# Patient Record
Sex: FEMALE | Race: WHITE | NOT HISPANIC OR LATINO | Employment: FULL TIME | URBAN - METROPOLITAN AREA
[De-identification: names, ages, dates, MRNs, and addresses within clinical notes are randomized per-mention and may not be internally consistent; named-entity substitution may affect disease eponyms.]

---

## 2023-06-28 PROBLEM — R42 VERTIGO: Status: ACTIVE | Noted: 2021-09-08

## 2023-06-28 PROBLEM — J30.9 ALLERGIC RHINITIS: Status: ACTIVE | Noted: 2022-09-15

## 2023-06-28 PROBLEM — E03.9 HYPOTHYROIDISM: Status: ACTIVE | Noted: 2022-09-15

## 2023-06-28 PROBLEM — E78.2 MIXED HYPERLIPIDEMIA: Chronic | Status: ACTIVE | Noted: 2022-09-15

## 2023-06-28 PROBLEM — K21.9 ESOPHAGEAL REFLUX: Chronic | Status: ACTIVE | Noted: 2022-09-15

## 2023-06-28 PROBLEM — I10 ESSENTIAL HYPERTENSION: Chronic | Status: ACTIVE | Noted: 2022-09-15

## 2023-06-28 PROBLEM — L98.0 PYOGENIC GRANULOMA: Status: ACTIVE | Noted: 2019-07-31

## 2023-06-28 PROBLEM — F34.1 CHRONIC DEPRESSIVE PERSON: Chronic | Status: ACTIVE | Noted: 2022-09-15

## 2024-03-01 ENCOUNTER — OFFICE VISIT (OUTPATIENT)
Dept: INTERNAL MEDICINE | Facility: CLINIC | Age: 74
End: 2024-03-01
Payer: MEDICARE

## 2024-03-01 VITALS
DIASTOLIC BLOOD PRESSURE: 64 MMHG | WEIGHT: 187 LBS | RESPIRATION RATE: 18 BRPM | BODY MASS INDEX: 28.34 KG/M2 | HEART RATE: 84 BPM | OXYGEN SATURATION: 98 % | HEIGHT: 68 IN | SYSTOLIC BLOOD PRESSURE: 110 MMHG

## 2024-03-01 DIAGNOSIS — G89.29 CHRONIC LEFT SHOULDER PAIN: ICD-10-CM

## 2024-03-01 DIAGNOSIS — M25.512 CHRONIC LEFT SHOULDER PAIN: ICD-10-CM

## 2024-03-01 DIAGNOSIS — Z12.31 SCREENING MAMMOGRAM, ENCOUNTER FOR: ICD-10-CM

## 2024-03-01 DIAGNOSIS — M25.561 ACUTE PAIN OF RIGHT KNEE: Primary | ICD-10-CM

## 2024-03-01 NOTE — PROGRESS NOTES
Subjective   Amanda Hernandez is a 73 y.o. female.   Chief Complaint   Patient presents with    Shoulder Pain    Knee Pain   Answers submitted by the patient for this visit:  Primary Reason for Visit (Submitted on 2/23/2024)  What is the primary reason for your visit?: Extremity Pain  Lower Extremity Injury Questionnaire (Submitted on 2/23/2024)  Chief Complaint: Extremity pain  Injury: Yes  Injury mechanism: a fall  Foreign body present: no foreign bodies      Vitals:    03/01/24 1256   BP: 110/64   Pulse: 84   Resp: 18   SpO2: 98%     No LMP recorded. Patient is postmenopausal.    History of Present Illness  Mrs Hernandez is a 73 year old female patient who is here for an acute visit. She c/o right knee pain that started after she fell at home a few months ago . She states it was around the holidays. She fell at home. She was trying to get her dog into the kennel and stumbled over it. She states she went down on one of her sides but doesn't remember a direct blow to her knee. She states the pain is worse when she is walking her dog. She often feels that her knee is unstable . She is able to bear weight   She also c/o left shoulder pain for more than 6 months. She denies any injury. She is unable to lift her arm above her head       The following portions of the patient's history were reviewed and updated as appropriate: allergies, current medications, past family history, past medical history, past social history, past surgical history, and problem list.    Review of Systems   Musculoskeletal:  Negative for gait problem.        Right knee pain and left shoulder pain as described in HPI   Neurological:  Negative for weakness and numbness.       Objective   Physical Exam  Vitals and nursing note reviewed.   Constitutional:       General: She is not in acute distress.  Cardiovascular:      Rate and Rhythm: Normal rate.   Pulmonary:      Effort: Pulmonary effort is normal.   Musculoskeletal:      Left shoulder: No swelling,  deformity, tenderness or crepitus. Decreased range of motion. Normal pulse.      Left knee: No swelling, deformity or bony tenderness. Normal range of motion. Tenderness present. No patellar tendon tenderness.   Neurological:      Mental Status: She is alert.         Assessment & Plan   Diagnoses and all orders for this visit:    1. Acute pain of right knee (Primary)  -     Ambulatory Referral to Physical Therapy Evaluate and treat    2. Chronic left shoulder pain  -     Ambulatory Referral to Orthopedic Surgery    3. Screening mammogram, encounter for  -     Mammo screening digital tomosynthesis bilateral w CAD; Future    Will refer to PT    Recommended tylenol as needed  Apply heat or ice as needed  Activity as tolerated  Offered medrol dosepak but she declined. She may benefit from joint injections , will refer to ortho for consult  She is due for screening mammogram   Follow up as needed and for continual care

## 2024-03-04 ENCOUNTER — TELEPHONE (OUTPATIENT)
Dept: INTERNAL MEDICINE | Facility: CLINIC | Age: 74
End: 2024-03-04
Payer: MEDICARE

## 2024-03-04 DIAGNOSIS — M25.561 ACUTE PAIN OF RIGHT KNEE: Primary | ICD-10-CM

## 2024-03-04 NOTE — TELEPHONE ENCOUNTER
Please call the patient, ortho referral changed for knee evaluation only   I am still concerned about her shoulder due to limited range of motion

## 2024-03-04 NOTE — TELEPHONE ENCOUNTER
"Pt calling stating she recently saw Susanne and they both discussed the pain in pts right knee and shoulder pain- Pt states she called to schedule appt to see the orthopedic surgeon and they state that the referral that was sent over was only in regards to pts shoulder pain and they will only see pt for \"one body part only\"- pt states she can live with the shoulder pain and she is more focused on figuring out what's going on with her knee- Pt would like the referral to be in regards to her right knee pain and not her shoulder pain if possible- Pt states she does not want to do physical therapy for her knee until she finds out what is wrong with it- Please advise  "

## 2024-03-04 NOTE — TELEPHONE ENCOUNTER
Spoke with patient, she definitely still wants to see Ortho for her Shoulder but she needed another referral for her knee as well.   They wouldn't schedule 1 appointment for both issues.

## 2024-03-11 NOTE — PROGRESS NOTES
[facility]  New Problem      Patient Name: Amanda Hernandez  : 1950  Primary Care Physician: Susanne Hernandez APRN        Chief Complaint: Right knee pain, left shoulder pain    HPI:   Amanda Hernandez is a 73 y.o. year old who presents today for evaluation of right knee pain, left shoulder pain.    Patient reports her knee bothers her the most right now.  She did have a fall back around gi and more recently has had worsening pain and symptoms.  She has mostly medial sided knee pain.  She had a history of torn meniscus many years ago and did have arthroscopy although she cannot recall exactly which knee it was.  She says this pain really feels a lot like that.  She enjoys walking her dog but has found that she is really only able to walk half a mile and she is pretty miserable by the time she returns back from her walk and is limping quite a bit.      Her shoulder is a more chronic issue, does not bother her quite as much as the knee but she is noticed some increasing pain recently.  She has some trouble with range of motion especially lateral raising.  She really is just modified her activities to account for her symptoms.     She usually manages her symptoms with Tylenol and rest.  She was referred for physical therapy on her knee and has an appointment on 3/27.    She also reported some hip pain she has a separate visit to address that but she reports that the hip pain is mostly posterior lateral in both hips.  No numbness or tingling no bowel or bladder changes.  She denies any dedicated groin pain.      Past Medical/Surgical, Social and Family History:  I have reviewed and/or updated pertinent history as noted in the medical record including:  Past Medical History:   Diagnosis Date    Allergic 1960    Been most of my life    Arthritis     GERD (gastroesophageal reflux disease)     Hyperlipidemia  ?    Hypertension  ?    Hyperthyroidism     Visual impairment Wear glasses      Past Surgical History:   Procedure Laterality Date    APPENDECTOMY      COLONOSCOPY   ?    HYSTERECTOMY  1992    TONSILLECTOMY   ?     Social History     Occupational History    Not on file   Tobacco Use    Smoking status: Former     Current packs/day: 0.00     Average packs/day: 0.5 packs/day for 2.0 years (1.0 ttl pk-yrs)     Types: Cigarettes     Start date: 1971     Quit date: 1973     Years since quittin.2    Smokeless tobacco: Never   Vaping Use    Vaping status: Never Used   Substance and Sexual Activity    Alcohol use: Not Currently    Drug use: Never    Sexual activity: Not Currently          Allergies:   Allergies   Allergen Reactions    Morphine And Related GI Intolerance, Nausea And Vomiting and Nausea Only    Sulfa Antibiotics Nausea And Vomiting       Medications:   Home Medications:  Current Outpatient Medications on File Prior to Visit   Medication Sig    acetaminophen (TYLENOL) 650 MG 8 hr tablet Take 2 tablets by mouth 4 (Four) Times a Day.    aspirin 81 MG EC tablet Take 1 tablet by mouth Daily.    atorvastatin (LIPITOR) 40 MG tablet Take 1 tablet by mouth Daily.    Calcium Carbonate-Vitamin D (CALTRATE 600+D PO) Take  by mouth.    escitalopram (LEXAPRO) 10 MG tablet Take 1 tablet by mouth Daily.    esomeprazole (nexIUM) 20 MG capsule Take 1 capsule by mouth Every Morning Before Breakfast.    fluticasone (FLONASE) 50 MCG/ACT nasal spray INSTILL 1 SPRAY INTO EACH NOSTRIL DAILY    guaiFENesin (MUCINEX) 600 MG 12 hr tablet Take 1 tablet by mouth.    hydroCHLOROthiazide (HYDRODIURIL) 25 MG tablet Take 1 tablet by mouth Every Morning.    levothyroxine (SYNTHROID, LEVOTHROID) 88 MCG tablet Take 1 tablet by mouth Daily.    losartan (COZAAR) 100 MG tablet Take 1 tablet by mouth Daily.    Omega-3 Fatty Acids (OMEGA 3 PO) Take  by mouth.    vitamin D3 125 MCG (5000 UT) capsule capsule Take 1 capsule by mouth Daily.     No current facility-administered medications on file prior  to visit.         ROS:  14 point review of systems was negative except as listed in the HPI.    Physical Exam:   73 y.o. female  Body mass index is 30.45 kg/m²., 83 kg (183 lb)  Vitals:    03/12/24 1429   Temp: 98.4 °F (36.9 °C)     General: Alert, cooperative, appears well and in no observable distress. Appears stated age and BMI as listed above.  HEENT: Normocephalic, atraumatic on external visual inspection.  CV: No significant peripheral edema.  Respiratory: Normal respiratory effort.  Skin: Warm & well perfused; appropriate skin turgor.  Psych: Appropriate mood & affect.  Neuro: Gross sensation and motor intact in affected extremity/extremities.  Vascular: Peripheral pulses palpable in affected extremity/extremities.     MSK Exam:    Knee Exam:    Right   No deformity or wounds appreciated. No significant redness or warmth.  Trace effusion noted.  Tenderness along the joint line appreciated medially.  ROM 0-130 with pain at terminal motion.  Ligamentous exam grossly stable.  Paulina + medial  Bounce Home +  Quad strength 4-4+/5    Left   No deformity or wounds appreciated. No significant redness or warmth.  No significant effusion noted.  No significant tenderness appreciated about the joint.  ROM 0-130 and painless.  Ligamentous exam grossly stable.  Quad strength 4+ to 5/5.    Brief hip exam in the affected extremity(ies) grossly unremarkable.  Moves ankle and toes up and down, no significant pain or swelling in the foot, ankle or calf.     Left Shoulder  No obvious deformities or wounds.   No redness or significant swelling.  Tenderness  lateral acromial, posterior acromial  Definitive painful arc.  +empty can  Negative drop arm test  AROM  Flexion 140 passively I can get her to 170 with pain  ER 50   IR lower thoracic spine     Rotator Cuff Strength:  Supraspinatus: 3+  ER: 3+   IR: 4+  Isometric testing of the rotator cuff is painful.      Brief examination of the cervical spine did not reproduce any  specific radicular pattern or symptoms.   Strength is reasonable and symmetric in the upper extremities.    Radiology:    The following X-rays were ordered/reviewed today to evaluate the patient's symptoms: Single Knee: AP standing and sunrise views of both knees, and lateral view of painful knee show overall she has reasonable maintenance of joint space in both knees.  Maybe some slight subtle narrowing but no significant degenerative changes appreciated.  No other acute or chronic bony pathology to account for reported symptoms. Shoulder: AP, Scapular Y and Axillary Lateral of left shoulder(s) show Degenerative changes at the acromioclavicular joint, she does have some cystic changes as well in the humeral head at the greater tuberosity.  I do appreciate some subtle glenohumeral narrowing as well.  Aside from that I do not appreciate any acute or chronic findings to account for her pain.. There are no prior films available for direct comparison.    Procedure:   See Procedure Note: The potential risks and benefits of performing a diagnostic and therapeutic injection were discussed with the patient prior to procedure. Risks include, but are not limited to infection, swelling, transient increase in pain, bleeding, bruising. Patient was advised that injections are a diagnostic and therapeutic tool meaning they may not alleviate symptoms at all, or may only provide partial or temporary relief. Injection precautions and aftercare discussed.    Large Joint Arthrocentesis: L subacromial bursa  Date/Time: 3/12/2024 3:03 PM  Consent given by: patient  Site marked: site marked  Timeout: Immediately prior to procedure a time out was called to verify the correct patient, procedure, equipment, support staff and site/side marked as required   Supporting Documentation  Indications: pain   Procedure Details  Location: shoulder - L subacromial bursa  Preparation: Patient was prepped and draped in the usual sterile fashion  Needle  gauge: 21G.  Approach: posterior  Medications administered: 80 mg methylPREDNISolone acetate 80 MG/ML; 2 mL lidocaine PF 1% 1 %  Patient tolerance: patient tolerated the procedure well with no immediate complications      Large Joint Arthrocentesis: R knee  Date/Time: 3/12/2024 3:03 PM  Consent given by: patient  Site marked: site marked  Timeout: Immediately prior to procedure a time out was called to verify the correct patient, procedure, equipment, support staff and site/side marked as required   Supporting Documentation  Indications: pain, joint swelling and diagnostic evaluation   Procedure Details  Location: knee - R knee  Preparation: Patient was prepped and draped in the usual sterile fashion  Needle gauge: 21G.  Medications administered: 1 mL methylPREDNISolone acetate 80 MG/ML; 2 mL lidocaine PF 1% 1 %  Patient tolerance: patient tolerated the procedure well with no immediate complications          Misc. Data/Labs: N/A    Assessment & Plan:    ICD-10-CM ICD-9-CM   1. Chronic left shoulder pain  M25.512 719.41    G89.29 338.29   2. Dysfunction of left rotator cuff  M67.912 726.10   3. Chronic pain of right knee  M25.561 719.46    G89.29 338.29   4. Acute medial meniscus tear of right knee, subsequent encounter  S83.241D V58.89     836.0     No orders of the defined types were placed in this encounter.    Orders Placed This Encounter   Procedures    Large Joint Arthrocentesis: L subacromial bursa    Large Joint Arthrocentesis: R knee    XR Knee 3 View Right    XR Shoulder 2+ View Left     Is a 73-year-old female with right knee and left shoulder pain.  I do agree with her I think her right knee pain is concerning for meniscal pathology but I would try and treat this conservatively at least initially.  Will go ahead and try a cortisone injection as a diagnostic tool and I agree with getting her into some physical therapy.    As far as her shoulder goes I suspect this is rotator cuff pathology and I would lean  more towards this being a rotator cuff tear.  Plan is to proceed with a subacromial injection to help calm her pain symptoms down and I like her to also talk with therapy about exercises to strengthen her shoulder.    In our brief discussion about her hips I really think this is probably going to be more low back related.  I think physical therapy will benefit her and working on strengthening her knee and hips/core may alleviate her symptoms.  I will have her go ahead and make an appointment for about 4 to 5 weeks from now if she still having the hip pain will certainly work that up further.  Similarly if her symptoms change or worsen she can call me sooner.  If her shoulder and/or knee fails to improve with conservative treatment we will consider ordering an MRI to better evaluate her.    Return in about 5 weeks (around 4/16/2024).    Patient encouraged to call with questions or concerns prior to follow up.  Recommend ICE and/or HEAT PRN as discussed.  Will discuss with attending physician as needed.  Consider additional referrals, work up and/or advanced imaging as indicated or if patient fails to respond to conservative care.        Camilo Solano, APRN

## 2024-03-12 ENCOUNTER — OFFICE VISIT (OUTPATIENT)
Dept: ORTHOPEDIC SURGERY | Facility: CLINIC | Age: 74
End: 2024-03-12
Payer: MEDICARE

## 2024-03-12 VITALS — TEMPERATURE: 98.4 F | WEIGHT: 183 LBS | BODY MASS INDEX: 30.49 KG/M2 | HEIGHT: 65 IN

## 2024-03-12 DIAGNOSIS — S83.241D ACUTE MEDIAL MENISCUS TEAR OF RIGHT KNEE, SUBSEQUENT ENCOUNTER: ICD-10-CM

## 2024-03-12 DIAGNOSIS — M25.561 CHRONIC PAIN OF RIGHT KNEE: ICD-10-CM

## 2024-03-12 DIAGNOSIS — G89.29 CHRONIC PAIN OF RIGHT KNEE: ICD-10-CM

## 2024-03-12 DIAGNOSIS — M67.912 DYSFUNCTION OF LEFT ROTATOR CUFF: ICD-10-CM

## 2024-03-12 DIAGNOSIS — G89.29 CHRONIC LEFT SHOULDER PAIN: Primary | ICD-10-CM

## 2024-03-12 DIAGNOSIS — M25.512 CHRONIC LEFT SHOULDER PAIN: Primary | ICD-10-CM

## 2024-03-12 PROCEDURE — 20610 DRAIN/INJ JOINT/BURSA W/O US: CPT | Performed by: NURSE PRACTITIONER

## 2024-03-12 PROCEDURE — 73562 X-RAY EXAM OF KNEE 3: CPT | Performed by: NURSE PRACTITIONER

## 2024-03-12 PROCEDURE — 1159F MED LIST DOCD IN RCRD: CPT | Performed by: NURSE PRACTITIONER

## 2024-03-12 PROCEDURE — 99214 OFFICE O/P EST MOD 30 MIN: CPT | Performed by: NURSE PRACTITIONER

## 2024-03-12 PROCEDURE — 1160F RVW MEDS BY RX/DR IN RCRD: CPT | Performed by: NURSE PRACTITIONER

## 2024-03-12 PROCEDURE — 73030 X-RAY EXAM OF SHOULDER: CPT | Performed by: NURSE PRACTITIONER

## 2024-03-12 RX ORDER — METHYLPREDNISOLONE ACETATE 80 MG/ML
1 INJECTION, SUSPENSION INTRA-ARTICULAR; INTRALESIONAL; INTRAMUSCULAR; SOFT TISSUE
Status: COMPLETED | OUTPATIENT
Start: 2024-03-12 | End: 2024-03-12

## 2024-03-12 RX ORDER — METHYLPREDNISOLONE ACETATE 80 MG/ML
80 INJECTION, SUSPENSION INTRA-ARTICULAR; INTRALESIONAL; INTRAMUSCULAR; SOFT TISSUE
Status: COMPLETED | OUTPATIENT
Start: 2024-03-12 | End: 2024-03-12

## 2024-03-12 RX ORDER — LIDOCAINE HYDROCHLORIDE 10 MG/ML
2 INJECTION, SOLUTION EPIDURAL; INFILTRATION; INTRACAUDAL; PERINEURAL
Status: COMPLETED | OUTPATIENT
Start: 2024-03-12 | End: 2024-03-12

## 2024-03-12 RX ADMIN — METHYLPREDNISOLONE ACETATE 80 MG: 80 INJECTION, SUSPENSION INTRA-ARTICULAR; INTRALESIONAL; INTRAMUSCULAR; SOFT TISSUE at 15:03

## 2024-03-12 RX ADMIN — LIDOCAINE HYDROCHLORIDE 2 ML: 10 INJECTION, SOLUTION EPIDURAL; INFILTRATION; INTRACAUDAL; PERINEURAL at 15:03

## 2024-03-12 RX ADMIN — METHYLPREDNISOLONE ACETATE 1 ML: 80 INJECTION, SUSPENSION INTRA-ARTICULAR; INTRALESIONAL; INTRAMUSCULAR; SOFT TISSUE at 15:03

## 2024-03-15 ENCOUNTER — PATIENT ROUNDING (BHMG ONLY) (OUTPATIENT)
Dept: ORTHOPEDIC SURGERY | Facility: CLINIC | Age: 74
End: 2024-03-15
Payer: MEDICARE

## 2024-03-15 NOTE — PROGRESS NOTES
A avolution Message has been sent to the patient for PATIENT ROUNDING with [facility]

## 2024-03-27 ENCOUNTER — TREATMENT (OUTPATIENT)
Age: 74
End: 2024-03-27
Payer: MEDICARE

## 2024-03-27 DIAGNOSIS — M25.561 ACUTE PAIN OF RIGHT KNEE: Primary | ICD-10-CM

## 2024-03-27 DIAGNOSIS — Z74.09 IMPAIRED FUNCTIONAL MOBILITY, BALANCE, GAIT, AND ENDURANCE: ICD-10-CM

## 2024-03-27 PROCEDURE — 97110 THERAPEUTIC EXERCISES: CPT | Performed by: PHYSICAL THERAPIST

## 2024-03-27 PROCEDURE — 97162 PT EVAL MOD COMPLEX 30 MIN: CPT | Performed by: PHYSICAL THERAPIST

## 2024-03-27 PROCEDURE — 97530 THERAPEUTIC ACTIVITIES: CPT | Performed by: PHYSICAL THERAPIST

## 2024-03-27 NOTE — PATIENT INSTRUCTIONS
Access Code: [email or web address]  URL: [email or web address]  Date: 03/27/2024  Prepared by: Kiana Garcia    Exercises  - Supine Hip Adduction Isometric with Ball  - 1 x daily - 7 x weekly - 1 sets - 10 reps - 5 sec. hold  - Supine Straight Leg Hip Adduction and Quad Set with Ball  - 1 x daily - 7 x weekly - 1 sets - 10 reps - 5 sec. hold  - Hooklying Clamshell with Resistance  - 1 x daily - 7 x weekly - 1 sets - 10 reps - 5 sec. hold  - Hooklying Isometric Clamshell  - 1 x daily - 7 x weekly - 1 sets - 10 reps - 5 sec. hold  - Long Sitting Straight Leg Raise with External Rotation  - 1 x daily - 7 x weekly - 1 sets - 10 reps

## 2024-03-27 NOTE — PROGRESS NOTES
Physical Therapy Initial Evaluation and Plan of Care  [facility] [place]   [address] [place] [address]  P: [phone]       F: [phone]       Patient: Amanda Hernandez   : 1950  Visit Diagnoses:     ICD-10-CM ICD-9-CM   1. Acute pain of right knee  M25.561 719.46   2. Impaired functional mobility, balance, gait, and endurance  Z74.09 V49.89     Referring practitioner: JEFFERSON Villar  Date of Initial Visit: 3/27/2024  Today's Date: 3/27/2024  Patient seen for 1 sessions           Subjective Questionnaire: WOMAC: 46/96      Subjective Evaluation    History of Present Illness  Date of onset: 3/1/2024  Mechanism of injury: Patient reports right knee pain started during the holidays due to a fall.  Tripped over dog and fell, landed on left side. Tried to work it out, pain persisted.  MD gave her injection in left shoulder and right knee.      Had been able to walk the dog again since injection.      PMH copied from EMR:    Allergies and Adverse Reactions    Allergic rhinitis      Cardiac and Vasculature    Essential hypertension    Mixed hyperlipidemia      ENT    Vertigo      Endocrine and Metabolic    Hypothyroidism      Gastrointestinal Abdominal    Esophageal reflux      Mental Health    Chronic depressive person      Skin    Pyogenic granuloma      Subjective comment: Patient reports right knee pain.  Patient Occupation: [employer]-works from home. Pain  At best pain ratin  At worst pain rating: 3  Location: Right knee-medial  Quality: sore/tender since injection; sharp prior to injection.  Alleviating factors: Tylenol arthritis or advil.  Aggravating factors: standing, ambulation, squatting and sleeping  Symptom course: improved since injection.    Social Support  Lives in: condominium (first floor)  Lives with: alone    Hand dominance: right    Treatments  Current treatment: injection treatment  Patient Goals  Patient goals for therapy: decreased  pain and increased strength             Objective          Active Range of Motion   Left Knee   Flexion: 145 degrees   Extension: 0 degrees     Right Knee   Flexion: 140 degrees   Extension: 0 degrees     Patellar Mobility   Left Knee Patellar tendons within functional limits include the medial, lateral, superior and inferior.     Right Knee Patellar tendons within functional limits include the medial, lateral, superior and inferior.     Strength/Myotome Testing     Left Hip   Planes of Motion   Flexion: 4  Extension: 4  Abduction: 4  External rotation: 4  Internal rotation: 4    Right Hip   Planes of Motion   Flexion: 4+  Extension: 4  Abduction: 4  External rotation: 5  Internal rotation: 5    Left Knee   Flexion: 5  Extension: 5    Right Knee   Flexion: 5  Extension: 4    Tests     Left Hip   Positive Ely's.   Negative UTE.   Modified Raman: Positive.     Right Hip   Positive Ely's, UTE and FADIR.   Modified Raman: Positive.     Additional Tests Details  SIJ mal-alignment    Left Knee Flexibility Comments:   SLR: 60 degrees    Right Knee Flexibility Comments:   SLR: 60 degrees    Ambulation     Observational Gait   Gait: antalgic and asymmetric     Functional Assessment   Squat   Pain and sitting toward right side.     Single Leg Stance   Left: 0 seconds  Right: 7 seconds    Comments  5XSTS: 16.25 seconds (bias to right LE, swerving with up and down)          Assessment & Plan       Assessment  Impairments: abnormal gait, abnormal or restricted ROM, activity intolerance, impaired physical strength, lacks appropriate home exercise program and pain with function   Functional limitations: sleeping, walking, uncomfortable because of pain and standing (Squatting  )  Assessment details: Amanda Hernandez is a pleasant 73 y.o. female that presents with decreased knee and hip strength, decreased flexibility, impaired gait, impaired balance and pain limited functional activity tolerance. Pt will benefit from skilled PT  services in order to address listed impairments, decrease pain and restore function.    Prognosis: good  Prognosis details: Patient demonstrates good rehab potential as evidenced by high motivation to participate with PT POC and to return to PLOF/ADLs/IADLs.    Goals  Plan Goals: Short Term Goals (3 wks):  1.  Patient will have increased right knee VMO activation to WNL.  2.  Patient will have symmetrical SIJ alignment.  3.  Patient will demonstrate normalized gait pattern.  4.  Patient will have LE muscle flexibility WNLs.  5.  Patient will report decreased pain level to allow for improved restorative sleep.      Long Term Goals (6-8 wks):  1.  Patient will be independent in performance of HEP.  2.  Patient will have right knee strength of 5/5.  3.  Patient will have increased hip strength to 4+/5.  4.  Patient will have improved LEFS score of 70/80 or better.  5.  Patient will be able to perform functional squat without pain compensations.      Plan  Therapy options: will be seen for skilled therapy services  Planned modality interventions: ultrasound, thermotherapy (hydrocollator packs), cryotherapy and dry needling  Planned therapy interventions: manual therapy, soft tissue mobilization, strengthening, stretching, balance/weight-bearing training, flexibility, functional ROM exercises, gait training, joint mobilization, home exercise program, neuromuscular re-education, therapeutic activities, abdominal trunk stabilization and body mechanics training  Frequency: 1x week  Duration in weeks: 8  Treatment plan discussed with: patient  Plan details: Pt was educated on the importance of their HEP and their current need for continued skilled physical therapy. Patients goals and potential limitations were discussed and pt is in agreement with current plan of care and treatment emphasis.                History # of Personal Factors and/or Comorbidities: HIGH (3+)  Examination of Body System(s): # of elements: MODERATE  (3)  Clinical Presentation: STABLE   Clinical Decision Making: MODERATE      Timed:         Manual Therapy:         mins  27140;     Therapeutic Exercise:    20     mins  39731;     Neuromuscular Maggi:        mins  13449;    Therapeutic Activity:     10     mins  67223;     Gait Training:           mins  15804;     Ultrasound:          mins  43362;    Ionto                                  mins  37077  Self Care                            mins  04369  Canalith Repos         mins  21756  Orthotic MGMT/Train         mins  43286    Un-Timed:  Electrical Stimulation:         mins  94277 ( );  Dry Needling:          mins  87853 self-pay;  Dry Needling:          mins  28047 self-pay  Traction          mins  63080  Low Eval          mins  95218  Mod Eval     30     mins  05283  High Eval                            mins  28802    Timed Treatment:   30   mins   Total Treatment:     60   mins      PT SIGNATURE: Kiana Garcia PT     License Number: [id number]  Electronically signed by Kiana Garcia PT, 03/27/24, 11:40 AM EDT      DATE TREATMENT INITIATED: 3/27/2024    Initial Certification  Certification Period: 3/27/2024 thru 6/24/2024  I certify that the therapy services are furnished while this patient is under my care.  The services outlined above are required by this patient, and will be reviewed every 90 days.     PHYSICIAN: Susanne Hernandez APRN      NPI: [id number]  DATE:         Please sign and return via fax to [phone]. Thank you, [facility].

## 2024-04-03 RX ORDER — FLUTICASONE PROPIONATE 50 MCG
2 SPRAY, SUSPENSION (ML) NASAL DAILY
Qty: 16 G | Refills: 0 | Status: SHIPPED | OUTPATIENT
Start: 2024-04-03 | End: 2024-04-04

## 2024-04-04 ENCOUNTER — TREATMENT (OUTPATIENT)
Age: 74
End: 2024-04-04
Payer: MEDICARE

## 2024-04-04 DIAGNOSIS — Z74.09 IMPAIRED FUNCTIONAL MOBILITY, BALANCE, GAIT, AND ENDURANCE: ICD-10-CM

## 2024-04-04 DIAGNOSIS — M25.561 ACUTE PAIN OF RIGHT KNEE: Primary | ICD-10-CM

## 2024-04-04 PROCEDURE — 97112 NEUROMUSCULAR REEDUCATION: CPT | Performed by: PHYSICAL THERAPIST

## 2024-04-04 PROCEDURE — 97110 THERAPEUTIC EXERCISES: CPT | Performed by: PHYSICAL THERAPIST

## 2024-04-04 RX ORDER — FLUTICASONE PROPIONATE 50 MCG
2 SPRAY, SUSPENSION (ML) NASAL DAILY
Qty: 48 G | Refills: 3 | Status: SHIPPED | OUTPATIENT
Start: 2024-04-04

## 2024-04-04 NOTE — PROGRESS NOTES
Physical Therapy Treatment Note  [facility] [place]   [address] [place] [address]  P: [phone]       F: [phone]      Patient: Amanda Hernandez   : 1950  Treatment Diagnosis:     ICD-10-CM ICD-9-CM   1. Acute pain of right knee  M25.561 719.46   2. Impaired functional mobility, balance, gait, and endurance  Z74.09 V49.89     Referring practitioner: JEFFERSON Villar  Date of Initial Visit: Type: THERAPY  Noted: 3/27/2024  Today's Date: 2024  Patient seen for 2 sessions           Subjective   Patient reports she wasn't too good with her home exercises.  States she can feel the strength difference in her left knee especially when walking.     Objective     See Exercise, Manual, and Modality Logs for complete treatment.       Assessment/Plan  Patient performed program with progressed exercises for strengthening and core stabilization.  Benefits from cueing for technique and to prevent compensatory patterns.  Progressed HEP and provided written instructions for home use.    Progress per Plan of Care and Progress strengthening /stabilization /functional activity           Timed:         Manual Therapy:         mins  44218;     Therapeutic Exercise:    30     mins  53160;     Neuromuscular Maggi:    8    mins  18047;    Therapeutic Activity:          mins  02887;     Gait Training:           mins  01253;     Ultrasound:          mins  97624;    Ionto                                  mins  30005  Self Care                            mins  06590  Canalith Repos         mins  94243  Orthotic MGMT/Train         mins  60918    Un-Timed:  Electrical Stimulation:         mins  95674 ( );  Dry Needling:          mins  31813 self-pay;  Dry Needling:          mins  78105 self-pay  Traction          mins  86730      Timed Treatment:   38   mins   Total Treatment:     38   mins        PT SIGNATURE: Kiana Garcia PT     License Number: [id number]  Electronically  signed by Kiana Garcia, PT, 04/04/24, 1:48 PM EDT                 unk

## 2024-04-08 RX ORDER — ATORVASTATIN CALCIUM 40 MG/1
40 TABLET, FILM COATED ORAL DAILY
Qty: 90 TABLET | Refills: 3 | OUTPATIENT
Start: 2024-04-08

## 2024-04-08 RX ORDER — ATORVASTATIN CALCIUM 40 MG/1
40 TABLET, FILM COATED ORAL DAILY
Qty: 90 TABLET | Refills: 3 | Status: SHIPPED | OUTPATIENT
Start: 2024-04-08

## 2024-04-11 ENCOUNTER — TREATMENT (OUTPATIENT)
Age: 74
End: 2024-04-11
Payer: MEDICARE

## 2024-04-11 DIAGNOSIS — M25.561 ACUTE PAIN OF RIGHT KNEE: Primary | ICD-10-CM

## 2024-04-11 DIAGNOSIS — Z74.09 IMPAIRED FUNCTIONAL MOBILITY, BALANCE, GAIT, AND ENDURANCE: ICD-10-CM

## 2024-04-11 PROCEDURE — 97530 THERAPEUTIC ACTIVITIES: CPT | Performed by: PHYSICAL THERAPIST

## 2024-04-11 PROCEDURE — 97110 THERAPEUTIC EXERCISES: CPT | Performed by: PHYSICAL THERAPIST

## 2024-04-11 NOTE — PROGRESS NOTES
Physical Therapy Treatment Note  [facility] [place]   [address] [place] [address]  P: [phone]       F: [phone]      Patient: Amanda Hernandez   : 1950  Treatment Diagnosis:     ICD-10-CM ICD-9-CM   1. Acute pain of right knee  M25.561 719.46   2. Impaired functional mobility, balance, gait, and endurance  Z74.09 V49.89     Referring practitioner: JEFFERSON Villar  Date of Initial Visit: Type: THERAPY  Noted: 3/27/2024  Today's Date: 2024  Patient seen for 3 sessions           Subjective   Patient reports she had some workout soreness after last session.  States she also increased her walking time closer to 25 minutes when walking her dog.  States the weather also has her sore and stiff today.  States she had difficulty getting her exercises in with her current routine structure.      Objective     See Exercise, Manual, and Modality Logs for complete treatment.       Assessment/Plan  Progressed program with mobility and flexibility exercises.  She responded with improved symptoms following completion of program.  Discussed strategies to incorporate exercises into her day.    Progress per Plan of Care and Progress strengthening /stabilization /functional activity           Timed:         Manual Therapy:         mins  86440;     Therapeutic Exercise:    20     mins  87613;     Neuromuscular Maggi:        mins  10778;    Therapeutic Activity:     8     mins  21067;     Gait Training:           mins  74677;     Ultrasound:          mins  70484;    Ionto                                  mins  13041  Self Care                            mins  42836  Canalith Repos         mins  30830  Orthotic MGMT/Train         mins  86711    Un-Timed:  Electrical Stimulation:         mins  10900 ( );  Dry Needling:          mins  94884 self-pay;  Dry Needling:          mins  08215 self-pay  Traction          mins  76797      Timed Treatment:   28   mins   Total  Treatment:     28   mins        PT SIGNATURE: Kiana Garcia PT     License Number: [id number]  Electronically signed by Kiana Garcia PT, 04/11/24, 11:27 AM EDT

## 2024-04-15 NOTE — PROGRESS NOTES
[facility]              Follow Up      Patient Name: Amanda Hernandez  : 1950  Primary Care Physician: Susanne Hernandez APRN        Chief Complaint: Left knee pain, right knee and left shoulder are improving    HPI:   Amanda Hernandez is a 73 y.o. year old who presents today for evaluation.  I last saw her approximately 5 weeks ago with complaints of right knee and left shoulder pain.  When I saw her her symptoms were concerning for meniscal pathology but we tried a cortisone injection as a diagnostic tool and she got great improvement with that.  She says she also got great improvement in her shoulder we did a subacromial injection suspecting she had some rotator cuff pathology.  She has been working with physical therapy and has been feeling a little more left knee pain.  Her therapist also agreed that she felt like her left knee was holding her back from improving on her therapy a little bit.  She does admit she is not as diligent with her exercises as she could be.  She really wanted an injection in her left knee since she got such great results after injecting the right knee.      Past Medical/Surgical, Social and Family History:  I have reviewed and/or updated pertinent history as noted in the medical record including:  Past Medical History:   Diagnosis Date    Allergic 1960    Been most of my life    Arthritis     Arthritis of back     Unsure    Fracture of wrist     Both wrists - as a child    GERD (gastroesophageal reflux disease)     Hip arthrosis     Unsure    Hyperlipidemia  ?    Hypertension  ?    Hyperthyroidism 1964    Knee swelling     Unsure    Tear of meniscus of knee     Unsure of date approx 30 yrs ago    Visual impairment Wear glasses     Past Surgical History:   Procedure Laterality Date    APPENDECTOMY      COLONOSCOPY   ?    HYSTERECTOMY      TONSILLECTOMY   ?     Social History     Occupational History    Not on file   Tobacco Use    Smoking status:  Former     Current packs/day: 0.00     Average packs/day: 0.5 packs/day for 2.0 years (1.0 ttl pk-yrs)     Types: Cigarettes     Start date: 1971     Quit date: 1973     Years since quittin.3    Smokeless tobacco: Never   Vaping Use    Vaping status: Never Used   Substance and Sexual Activity    Alcohol use: Not Currently    Drug use: Never    Sexual activity: Not Currently          Allergies:   Allergies   Allergen Reactions    Morphine And Related GI Intolerance, Nausea And Vomiting and Nausea Only    Sulfa Antibiotics Nausea And Vomiting       Medications:   Home Medications:  Current Outpatient Medications on File Prior to Visit   Medication Sig    aspirin 81 MG EC tablet Take 1 tablet by mouth Daily.    atorvastatin (LIPITOR) 40 MG tablet Take 1 tablet by mouth Daily.    Calcium Carbonate-Vitamin D (CALTRATE 600+D PO) Take  by mouth.    escitalopram (LEXAPRO) 10 MG tablet Take 1 tablet by mouth Daily.    esomeprazole (nexIUM) 20 MG capsule Take 1 capsule by mouth Every Morning Before Breakfast.    fluticasone (FLONASE) 50 MCG/ACT nasal spray INSTILL 2 SPRAYS IN EACH NOSTRIL DAILY    hydroCHLOROthiazide (HYDRODIURIL) 25 MG tablet Take 1 tablet by mouth Every Morning.    levothyroxine (SYNTHROID, LEVOTHROID) 88 MCG tablet Take 1 tablet by mouth Daily.    losartan (COZAAR) 100 MG tablet Take 1 tablet by mouth Daily.    Omega-3 Fatty Acids (OMEGA 3 PO) Take  by mouth.    vitamin D3 125 MCG (5000 UT) capsule capsule Take 1 capsule by mouth Daily.    acetaminophen (TYLENOL) 650 MG 8 hr tablet Take 2 tablets by mouth 4 (Four) Times a Day. (Patient not taking: Reported on 2024)    guaiFENesin (MUCINEX) 600 MG 12 hr tablet Take 1 tablet by mouth. (Patient not taking: Reported on 2024)     No current facility-administered medications on file prior to visit.         ROS:  ROS negative except as listed in the HPI.    Physical Exam:   73 y.o. female  Body mass index is 30.15 kg/m²., 82.2 kg (181 lb  3.2 oz)  Vitals:    04/16/24 1402   Temp: 98 °F (36.7 °C)     General: Alert, cooperative, appears well and in no observable distress. Appears stated age and BMI as listed above.  HEENT: Normocephalic, atraumatic on external visual inspection.  CV: No significant peripheral edema.  Respiratory: Normal respiratory effort.  Skin: Warm & well perfused; appropriate skin turgor.  Psych: Appropriate mood & affect.  Neuro: Gross sensation and motor intact in affected extremity/extremities.  Vascular: Peripheral pulses palpable in affected extremity/extremities.     MSK Exam:  Left Knee  No deformity or wounds appreciated. No significant redness or warmth.  Trace effusion noted  Tenderness along the joint line appreciated patellofemoral compartment  ROM 0-130, +crepitus  Ligamentous exam grossly stable  Quad strength 4+ /5    Brief hip exam in the affected extremity(ies) grossly unremarkable.  Moves ankle and toes up and down, no significant pain or swelling in the foot, ankle or calf.        Radiology:    The following X-rays were ordered/reviewed today to evaluate the patient's symptoms: Lateral view of the left knee was taken and compared with prior knee series 5 weeks ago.  She has a little patellofemoral arthritis really overall her knees look quite good.    Procedure:   See Procedure Note: The potential risks and benefits of performing a diagnostic and therapeutic injection were discussed with the patient prior to procedure. Risks include, but are not limited to infection, swelling, transient increase in pain, bleeding, bruising. Patient was advised that injections are a diagnostic and therapeutic tool meaning they may not alleviate symptoms at all, or may only provide partial or temporary relief. Injection precautions and aftercare discussed.      [facility] [place]abs[place] N/A    Assessment & Plan:    ICD-10-CM ICD-9-CM   1. Chronic pain of left knee  M25.562 719.46    G89.29 338.29   2. Patellofemoral arthralgia of left knee   M25.562 719.46     No orders of the defined types were placed in this encounter.    Orders Placed This Encounter   Procedures    Large Joint Arthrocentesis: L knee    XR Shoulder 2+ View Left    XR Knee 1 or 2 View Left       Is a 73-year-old female with a couple of orthopedic complaints.  I was concerned about meniscal pathology in the right knee however she really improved with an injection perhaps this was more arthritis despite reasonable appearing x-rays.  I think injecting the left knee is more than reasonable especially since it gave her such good relief.  She understands the importance of her strengthening exercises going to work on incorporating those into her routine.  Still suspect she has rotator cuff pathology but she is happy with where her shoulder is at the moment.  We talked about future appointments really as long as she is feeling okay I do not see a need for her to schedule follow-up however if her pain returns, changes or worsens I like her to call me and be glad to see her back.  At any point we could certainly consider further testing with an MRI if needed.    Return if symptoms worsen or fail to improve.    Patient encouraged to call with questions or concerns prior to follow up.  Recommend ICE and/or HEAT PRN as discussed.  Will discuss with attending physician as needed.  Consider additional referrals, work up and/or advanced imaging as indicated or if patient fails to respond to conservative care.        Camilo Solano, JEFFERSON      Dictated Utilizing Dragon Dictation    Large Joint Arthrocentesis: L knee  Date/Time: 4/16/2024 2:26 PM  Consent given by: patient  Site marked: site marked  Timeout: Immediately prior to procedure a time out was called to verify the correct patient, procedure, equipment, support staff and site/side marked as required   Supporting Documentation  Indications: pain and joint swelling   Procedure Details  Location: knee - L knee  Preparation: Patient was prepped  and draped in the usual sterile fashion  Needle gauge: 21 G.  Approach: anterolateral  Medications administered: 80 mg methylPREDNISolone acetate 80 MG/ML; 2 mL lidocaine PF 1% 1 %  Patient tolerance: patient tolerated the procedure well with no immediate complications

## 2024-04-16 ENCOUNTER — OFFICE VISIT (OUTPATIENT)
Dept: ORTHOPEDIC SURGERY | Facility: CLINIC | Age: 74
End: 2024-04-16
Payer: MEDICARE

## 2024-04-16 VITALS — HEIGHT: 65 IN | TEMPERATURE: 98 F | BODY MASS INDEX: 30.19 KG/M2 | WEIGHT: 181.2 LBS

## 2024-04-16 DIAGNOSIS — G89.29 CHRONIC PAIN OF LEFT KNEE: Primary | ICD-10-CM

## 2024-04-16 DIAGNOSIS — M25.562 PATELLOFEMORAL ARTHRALGIA OF LEFT KNEE: ICD-10-CM

## 2024-04-16 DIAGNOSIS — M25.562 CHRONIC PAIN OF LEFT KNEE: Primary | ICD-10-CM

## 2024-04-16 RX ADMIN — LIDOCAINE HYDROCHLORIDE 2 ML: 10 INJECTION, SOLUTION EPIDURAL; INFILTRATION; INTRACAUDAL; PERINEURAL at 14:26

## 2024-04-16 RX ADMIN — METHYLPREDNISOLONE ACETATE 80 MG: 80 INJECTION, SUSPENSION INTRA-ARTICULAR; INTRALESIONAL; INTRAMUSCULAR; SOFT TISSUE at 14:26

## 2024-04-18 ENCOUNTER — TREATMENT (OUTPATIENT)
Age: 74
End: 2024-04-18
Payer: MEDICARE

## 2024-04-18 DIAGNOSIS — Z74.09 IMPAIRED FUNCTIONAL MOBILITY, BALANCE, GAIT, AND ENDURANCE: ICD-10-CM

## 2024-04-18 DIAGNOSIS — M25.561 ACUTE PAIN OF RIGHT KNEE: Primary | ICD-10-CM

## 2024-04-18 RX ORDER — METHYLPREDNISOLONE ACETATE 80 MG/ML
80 INJECTION, SUSPENSION INTRA-ARTICULAR; INTRALESIONAL; INTRAMUSCULAR; SOFT TISSUE
Status: COMPLETED | OUTPATIENT
Start: 2024-04-16 | End: 2024-04-16

## 2024-04-18 RX ORDER — LIDOCAINE HYDROCHLORIDE 10 MG/ML
2 INJECTION, SOLUTION EPIDURAL; INFILTRATION; INTRACAUDAL; PERINEURAL
Status: COMPLETED | OUTPATIENT
Start: 2024-04-16 | End: 2024-04-16

## 2024-04-24 ENCOUNTER — HOSPITAL ENCOUNTER (OUTPATIENT)
Dept: MAMMOGRAPHY | Facility: HOSPITAL | Age: 74
Discharge: HOME | End: 2024-04-24
Admitting: NURSE PRACTITIONER
Payer: MEDICARE

## 2024-04-24 DIAGNOSIS — Z12.31 SCREENING MAMMOGRAM, ENCOUNTER FOR: ICD-10-CM

## 2024-04-24 PROCEDURE — 77063 BREAST TOMOSYNTHESIS BI: CPT

## 2024-04-24 PROCEDURE — 77067 SCR MAMMO BI INCL CAD: CPT

## 2024-04-25 NOTE — PROGRESS NOTES
Physical Therapy Daily Treatment Note    [facility]  [address]  [address]  [place] [address]     Patient: Amanda Hernandez   : 1950  Referring practitioner: JEFFERSON Villar  Date of Initial Visit: Type: THERAPY  Noted: 3/27/2024  Today's Date: 24  Patient seen for Visit count could not be calculated. Make sure you are using a visit which is associated with an episode. sessions         Amanda Hernandez reports:     PATIENTT ARRIVED TO CLINIC FOR APPOINTMENT, REPORTED ILLNESS AND WENT HOME    NO VISIT/NO NOTE/CHARGES - OPENED IN ERROR    Subjective     Objective   See Exercise, Manual, and Modality Logs for complete treatment.       Assessment/Plan                   Timed:  Manual Therapy:         mins  34559;  Therapeutic Exercise:         mins  69696;     Neuromuscular Maggi:        mins  62950;    Therapeutic Activity:          mins  05007;     Gait Training:           mins  71614;     Ultrasound:          mins  09376;    Self Care                    ___      mins 38848    Untimed:  Electrical Stimulation:         mins  52990 ( );  Mechanical Traction:         mins  12053;     Timed Treatment:      mins   Total Treatment:        mins  Kiana Garcia, PT  [facility]  Assistant  [id number]

## 2024-04-29 ENCOUNTER — TELEPHONE (OUTPATIENT)
Dept: INTERNAL MEDICINE | Facility: CLINIC | Age: 74
End: 2024-04-29
Payer: MEDICARE

## 2024-04-29 NOTE — TELEPHONE ENCOUNTER
----- Message from Zafar TRAMMELL sent at 4/29/2024 12:14 PM EDT -----  Please notify Ms Hernandez that her mammogram is negative, recommend routine mammogram yearly

## 2024-05-03 RX ORDER — FLUTICASONE PROPIONATE 50 MCG
2 SPRAY, SUSPENSION (ML) NASAL DAILY
Qty: 48 G | Refills: 3 | OUTPATIENT
Start: 2024-05-03

## 2024-06-06 ENCOUNTER — TELEPHONE (OUTPATIENT)
Dept: ORTHOPEDICS | Facility: OTHER | Age: 74
End: 2024-06-06
Payer: MEDICARE

## 2024-07-01 DIAGNOSIS — E78.2 MIXED HYPERLIPIDEMIA: Chronic | ICD-10-CM

## 2024-07-01 DIAGNOSIS — I10 ESSENTIAL HYPERTENSION: Primary | Chronic | ICD-10-CM

## 2024-07-02 RX ORDER — LOSARTAN POTASSIUM 100 MG/1
100 TABLET ORAL DAILY
Qty: 90 TABLET | Refills: 3 | Status: SHIPPED | OUTPATIENT
Start: 2024-07-02

## 2024-07-02 RX ORDER — LEVOTHYROXINE SODIUM 88 UG/1
88 TABLET ORAL DAILY
Qty: 90 TABLET | Refills: 3 | Status: SHIPPED | OUTPATIENT
Start: 2024-07-02

## 2024-07-02 RX ORDER — HYDROCHLOROTHIAZIDE 25 MG/1
25 TABLET ORAL EVERY MORNING
Qty: 90 TABLET | Refills: 3 | Status: SHIPPED | OUTPATIENT
Start: 2024-07-02

## 2024-07-09 LAB
ALBUMIN SERPL-MCNC: 4.3 G/DL (ref 3.5–5.2)
ALBUMIN/GLOB SERPL: 1.8 G/DL
ALP SERPL-CCNC: 82 U/L (ref 39–117)
ALT SERPL-CCNC: 19 U/L (ref 1–33)
APPEARANCE UR: CLEAR
AST SERPL-CCNC: 24 U/L (ref 1–32)
BACTERIA #/AREA URNS HPF: NORMAL /HPF
BASOPHILS # BLD AUTO: 0.05 10*3/MM3 (ref 0–0.2)
BASOPHILS NFR BLD AUTO: 1.1 % (ref 0–1.5)
BILIRUB SERPL-MCNC: 0.5 MG/DL (ref 0–1.2)
BILIRUB UR QL STRIP: NEGATIVE
BUN SERPL-MCNC: 20 MG/DL (ref 8–23)
BUN/CREAT SERPL: 19.6 (ref 7–25)
CALCIUM SERPL-MCNC: 9.7 MG/DL (ref 8.6–10.5)
CASTS URNS MICRO: NORMAL
CHLORIDE SERPL-SCNC: 104 MMOL/L (ref 98–107)
CHOLEST SERPL-MCNC: 160 MG/DL (ref 0–200)
CO2 SERPL-SCNC: 27.6 MMOL/L (ref 22–29)
COLOR UR: YELLOW
CREAT SERPL-MCNC: 1.02 MG/DL (ref 0.57–1)
EGFRCR SERPLBLD CKD-EPI 2021: 57.8 ML/MIN/1.73
EOSINOPHIL # BLD AUTO: 0.23 10*3/MM3 (ref 0–0.4)
EOSINOPHIL NFR BLD AUTO: 5.1 % (ref 0.3–6.2)
EPI CELLS #/AREA URNS HPF: NORMAL /HPF
ERYTHROCYTE [DISTWIDTH] IN BLOOD BY AUTOMATED COUNT: 12.1 % (ref 12.3–15.4)
GLOBULIN SER CALC-MCNC: 2.4 GM/DL
GLUCOSE SERPL-MCNC: 108 MG/DL (ref 65–99)
GLUCOSE UR QL STRIP: NEGATIVE
HCT VFR BLD AUTO: 43.4 % (ref 34–46.6)
HDLC SERPL-MCNC: 41 MG/DL (ref 40–60)
HGB BLD-MCNC: 14.4 G/DL (ref 12–15.9)
HGB UR QL STRIP: NEGATIVE
IMM GRANULOCYTES # BLD AUTO: 0.01 10*3/MM3 (ref 0–0.05)
IMM GRANULOCYTES NFR BLD AUTO: 0.2 % (ref 0–0.5)
KETONES UR QL STRIP: NEGATIVE
LDLC SERPL CALC-MCNC: 88 MG/DL (ref 0–100)
LDLC/HDLC SERPL: 2.01 {RATIO}
LEUKOCYTE ESTERASE UR QL STRIP: ABNORMAL
LYMPHOCYTES # BLD AUTO: 1.51 10*3/MM3 (ref 0.7–3.1)
LYMPHOCYTES NFR BLD AUTO: 33.7 % (ref 19.6–45.3)
MCH RBC QN AUTO: 30.4 PG (ref 26.6–33)
MCHC RBC AUTO-ENTMCNC: 33.2 G/DL (ref 31.5–35.7)
MCV RBC AUTO: 91.6 FL (ref 79–97)
MONOCYTES # BLD AUTO: 0.36 10*3/MM3 (ref 0.1–0.9)
MONOCYTES NFR BLD AUTO: 8 % (ref 5–12)
NEUTROPHILS # BLD AUTO: 2.32 10*3/MM3 (ref 1.7–7)
NEUTROPHILS NFR BLD AUTO: 51.9 % (ref 42.7–76)
NITRITE UR QL STRIP: NEGATIVE
NRBC BLD AUTO-RTO: 0 /100 WBC (ref 0–0.2)
PH UR STRIP: 7.5 [PH] (ref 5–8)
PLATELET # BLD AUTO: 254 10*3/MM3 (ref 140–450)
POTASSIUM SERPL-SCNC: 4.1 MMOL/L (ref 3.5–5.2)
PROT SERPL-MCNC: 6.7 G/DL (ref 6–8.5)
PROT UR QL STRIP: NEGATIVE
RBC # BLD AUTO: 4.74 10*6/MM3 (ref 3.77–5.28)
RBC #/AREA URNS HPF: NORMAL /HPF
SODIUM SERPL-SCNC: 144 MMOL/L (ref 136–145)
SP GR UR STRIP: 1.02 (ref 1–1.03)
T4 FREE SERPL-MCNC: 1.48 NG/DL (ref 0.93–1.7)
TRIGL SERPL-MCNC: 182 MG/DL (ref 0–150)
TSH SERPL DL<=0.005 MIU/L-ACNC: 0.28 UIU/ML (ref 0.27–4.2)
UROBILINOGEN UR STRIP-MCNC: ABNORMAL MG/DL
VLDLC SERPL CALC-MCNC: 31 MG/DL (ref 5–40)
WBC # BLD AUTO: 4.48 10*3/MM3 (ref 3.4–10.8)
WBC #/AREA URNS HPF: NORMAL /HPF

## 2024-07-12 ENCOUNTER — OFFICE VISIT (OUTPATIENT)
Dept: INTERNAL MEDICINE | Facility: CLINIC | Age: 74
End: 2024-07-12
Payer: MEDICARE

## 2024-07-12 VITALS
WEIGHT: 178 LBS | RESPIRATION RATE: 16 BRPM | HEART RATE: 82 BPM | HEIGHT: 65 IN | OXYGEN SATURATION: 97 % | SYSTOLIC BLOOD PRESSURE: 126 MMHG | DIASTOLIC BLOOD PRESSURE: 58 MMHG | BODY MASS INDEX: 29.66 KG/M2

## 2024-07-12 DIAGNOSIS — I10 ESSENTIAL HYPERTENSION: Chronic | ICD-10-CM

## 2024-07-12 DIAGNOSIS — Z78.0 POSTMENOPAUSAL: ICD-10-CM

## 2024-07-12 DIAGNOSIS — R42 VERTIGO: ICD-10-CM

## 2024-07-12 DIAGNOSIS — Z00.00 MEDICARE ANNUAL WELLNESS VISIT, SUBSEQUENT: Primary | ICD-10-CM

## 2024-07-12 DIAGNOSIS — E78.2 MIXED HYPERLIPIDEMIA: Chronic | ICD-10-CM

## 2024-07-12 PROCEDURE — 1159F MED LIST DOCD IN RCRD: CPT | Performed by: NURSE PRACTITIONER

## 2024-07-12 PROCEDURE — 1170F FXNL STATUS ASSESSED: CPT | Performed by: NURSE PRACTITIONER

## 2024-07-12 PROCEDURE — 1160F RVW MEDS BY RX/DR IN RCRD: CPT | Performed by: NURSE PRACTITIONER

## 2024-07-12 PROCEDURE — 99214 OFFICE O/P EST MOD 30 MIN: CPT | Performed by: NURSE PRACTITIONER

## 2024-07-12 PROCEDURE — 3078F DIAST BP <80 MM HG: CPT | Performed by: NURSE PRACTITIONER

## 2024-07-12 PROCEDURE — G0439 PPPS, SUBSEQ VISIT: HCPCS | Performed by: NURSE PRACTITIONER

## 2024-07-12 PROCEDURE — 3074F SYST BP LT 130 MM HG: CPT | Performed by: NURSE PRACTITIONER

## 2024-07-12 PROCEDURE — 1126F AMNT PAIN NOTED NONE PRSNT: CPT | Performed by: NURSE PRACTITIONER

## 2024-07-12 NOTE — PROGRESS NOTES
The ABCs of the Annual Wellness Visit  Subsequent Medicare Wellness Visit    Subjective    Amanda Hernandez is a 73 y.o. female who presents for a Subsequent Medicare Wellness Visit.    The following portions of the patient's history were reviewed and   updated as appropriate: allergies, current medications, past family history, past medical history, past social history, past surgical history, and problem list.    Compared to one year ago, the patient feels her physical   health is the same.    Compared to one year ago, the patient feels her mental   health is the same.    Recent Hospitalizations:  She was not admitted to the hospital during the last year.       Current Medical Providers:  Patient Care Team:  Susanne Hernandez APRN as PCP - General (Family Medicine)    Outpatient Medications Prior to Visit   Medication Sig Dispense Refill    aspirin 81 MG EC tablet Take 1 tablet by mouth Daily.      atorvastatin (LIPITOR) 40 MG tablet Take 1 tablet by mouth Daily. 90 tablet 3    Calcium Carbonate-Vitamin D (CALTRATE 600+D PO) Take  by mouth.      escitalopram (LEXAPRO) 10 MG tablet Take 1 tablet by mouth Daily. 90 tablet 3    esomeprazole (nexIUM) 20 MG capsule Take 1 capsule by mouth Every Morning Before Breakfast.      fluticasone (FLONASE) 50 MCG/ACT nasal spray INSTILL 2 SPRAYS IN EACH NOSTRIL DAILY 48 g 3    hydroCHLOROthiazide 25 MG tablet TAKE 1 TABLET BY MOUTH EVERY MORNING 90 tablet 3    levothyroxine (SYNTHROID, LEVOTHROID) 88 MCG tablet TAKE 1 TABLET BY MOUTH DAILY 90 tablet 3    losartan (COZAAR) 100 MG tablet TAKE 1 TABLET BY MOUTH DAILY 90 tablet 3    Omega-3 Fatty Acids (OMEGA 3 PO) Take  by mouth.      vitamin D3 125 MCG (5000 UT) capsule capsule Take 1 capsule by mouth Daily.      acetaminophen (TYLENOL) 650 MG 8 hr tablet Take 2 tablets by mouth 4 (Four) Times a Day. (Patient not taking: Reported on 4/16/2024)      guaiFENesin (MUCINEX) 600 MG 12 hr tablet Take 1 tablet by mouth. (Patient not taking:  "Reported on 2024)       No facility-administered medications prior to visit.       No opioid medication identified on active medication list. I have reviewed chart for other potential  high risk medication/s and harmful drug interactions in the elderly.        Aspirin is on active medication list. Aspirin use is indicated based on review of current medical condition/s. Pros and cons of this therapy have been discussed today. Benefits of this medication outweigh potential harm.  Patient has been encouraged to continue taking this medication.  .      Patient Active Problem List   Diagnosis    Allergic rhinitis    Chronic depressive person    Esophageal reflux    Essential hypertension    Hypothyroidism    Mixed hyperlipidemia    Pyogenic granuloma    Vertigo     Advance Care Planning   Advance Care Planning     Advance Directive is not on file.  ACP discussion was held with the patient during this visit. Patient has an advance directive (not in EMR), copy requested.     Objective    Vitals:    24 0950   BP: 126/58   Pulse: 82   Resp: 16   SpO2: 97%   Weight: 80.7 kg (178 lb)   Height: 165.1 cm (65\")   PainSc: 0-No pain     Estimated body mass index is 29.62 kg/m² as calculated from the following:    Height as of this encounter: 165.1 cm (65\").    Weight as of this encounter: 80.7 kg (178 lb).           Does the patient have evidence of cognitive impairment? No    Lab Results   Component Value Date    CHLPL 160 2024    TRIG 182 (H) 2024    HDL 41 2024    LDL 88 2024    VLDL 31 2024        HEALTH RISK ASSESSMENT    Smoking Status:  Social History     Tobacco Use   Smoking Status Former    Current packs/day: 0.00    Average packs/day: 0.5 packs/day for 2.0 years (1.0 ttl pk-yrs)    Types: Cigarettes    Start date: 1971    Quit date: 1973    Years since quittin.5   Smokeless Tobacco Never     Alcohol Consumption:  Social History     Substance and Sexual Activity "   Alcohol Use Not Currently     Fall Risk Screen:    STEVENADI Fall Risk Assessment was completed, and patient is at MODERATE risk for falls. Assessment completed on:2024    Depression Screenin/12/2024     9:52 AM   PHQ-2/PHQ-9 Depression Screening   Little Interest or Pleasure in Doing Things 0-->not at all   Feeling Down, Depressed or Hopeless 0-->not at all   PHQ-9: Brief Depression Severity Measure Score 0       Health Habits and Functional and Cognitive Screenin/12/2024     9:52 AM   Functional & Cognitive Status   Do you have difficulty preparing food and eating? No   Do you have difficulty bathing yourself, getting dressed or grooming yourself? No   Do you have difficulty using the toilet? No   Do you have difficulty moving around from place to place? No   Do you have trouble with steps or getting out of a bed or a chair? No   Current Diet Well Balanced Diet   Dental Exam Up to date   Eye Exam Up to date   Exercise (times per week) 7 times per week   Current Exercises Include Walking   Do you need help using the phone?  No   Are you deaf or do you have serious difficulty hearing?  No   Do you need help to go to places out of walking distance? No   Do you need help shopping? No   Do you need help preparing meals?  No   Do you need help with housework?  No   Do you need help with laundry? No   Do you need help taking your medications? No   Do you need help managing money? No   Do you ever drive or ride in a car without wearing a seat belt? No   Have you felt unusual stress, anger or loneliness in the last month? Yes   Who do you live with? Alone   If you need help, do you have trouble finding someone available to you? No   Have you been bothered in the last four weeks by sexual problems? No   Do you have difficulty concentrating, remembering or making decisions? No       Age-appropriate Screening Schedule:  Refer to the list below for future screening recommendations based on patient's age,  sex and/or medical conditions. Orders for these recommended tests are listed in the plan section. The patient has been provided with a written plan.    Health Maintenance   Topic Date Due    DXA SCAN  04/11/2020    COVID-19 Vaccine (5 - 2023-24 season) 10/01/2024 (Originally 2/11/2024)    TDAP/TD VACCINES (2 - Td or Tdap) 07/12/2025 (Originally 9/5/2022)    INFLUENZA VACCINE  08/01/2024    LIPID PANEL  07/08/2025    ANNUAL WELLNESS VISIT  07/12/2025    BMI FOLLOWUP  07/12/2025    COLORECTAL CANCER SCREENING  10/01/2025    MAMMOGRAM  04/24/2026    HEPATITIS C SCREENING  Completed    Pneumococcal Vaccine 65+  Completed    ZOSTER VACCINE  Completed                  CMS Preventative Services Quick Reference  Risk Factors Identified During Encounter  Fall Risk-High or Moderate: Information on Fall Prevention Shared in After Visit Summary  Immunizations Discussed/Encouraged: Tdap and COVID19  The above risks/problems have been discussed with the patient.  Pertinent information has been shared with the patient in the After Visit Summary.  An After Visit Summary and PPPS were made available to the patient.    Follow Up:   Next Medicare Wellness visit to be scheduled in 1 year.       Additional E&M Note during same encounter follows:  Patient has multiple medical problems which are significant and separately identifiable that require additional work above and beyond the Medicare Wellness Visit.      Chief Complaint  Medicare Wellness-subsequent    Subjective        HPI  Amanda Hernandez is also being seen today for a follow up for HTN, HLD, and Hypothyroidism. She has a history of vertigo and had a full work up with cardiology and in the hospital . CT and MRI were normal. She has had intermittent dizziness over the last few months . She denies chest pain, shortness of breath, headache and visual disturbance. She has taken her blood pressure when this occurs and it is normal. She denies any falls.       Review of Systems   HENT:  "Negative.     Respiratory:  Negative for chest tightness.    Cardiovascular:  Negative for chest pain.   Gastrointestinal:  Negative for nausea.   Genitourinary: Negative.    Musculoskeletal: Negative.    Neurological:  Positive for dizziness (intermittent over the last few months).   Hematological: Negative.    Psychiatric/Behavioral: Negative.         Objective   Vital Signs:  /58   Pulse 82   Resp 16   Ht 165.1 cm (65\")   Wt 80.7 kg (178 lb)   SpO2 97%   BMI 29.62 kg/m²     Physical Exam  Vitals and nursing note reviewed.   Constitutional:       General: She is not in acute distress.     Appearance: Normal appearance. She is well-developed and well-groomed.   HENT:      Head: Normocephalic.      Right Ear: Tympanic membrane and ear canal normal.      Left Ear: Tympanic membrane and ear canal normal.      Nose: Nose normal.      Mouth/Throat:      Pharynx: Oropharynx is clear.   Eyes:      General: Lids are normal.      Conjunctiva/sclera: Conjunctivae normal.   Neck:      Thyroid: No thyromegaly.   Cardiovascular:      Rate and Rhythm: Normal rate and regular rhythm.      Heart sounds: Normal heart sounds.   Pulmonary:      Effort: Pulmonary effort is normal.      Breath sounds: Normal breath sounds.   Abdominal:      General: Bowel sounds are normal.      Tenderness: There is no abdominal tenderness.   Musculoskeletal:      Cervical back: Neck supple.   Skin:     General: Skin is warm and dry.   Neurological:      Mental Status: She is alert and oriented to person, place, and time.   Psychiatric:         Mood and Affect: Mood normal.          The following data was reviewed by: JEFFERSON Mederos on 07/12/2024:    Orders Only on 07/01/2024   Component Date Value Ref Range Status    Glucose 07/08/2024 108 (H)  65 - 99 mg/dL Final    BUN 07/08/2024 20  8 - 23 mg/dL Final    Creatinine 07/08/2024 1.02 (H)  0.57 - 1.00 mg/dL Final    EGFR Result 07/08/2024 57.8 (L)  >60.0 mL/min/1.73 Final    " Comment: GFR Normal >60  Chronic Kidney Disease <60  Kidney Failure <15  The GFR formula is only valid for adults with stable renal  function between ages 18 and 70.      BUN/Creatinine Ratio 07/08/2024 19.6  7.0 - 25.0 Final    Sodium 07/08/2024 144  136 - 145 mmol/L Final    Potassium 07/08/2024 4.1  3.5 - 5.2 mmol/L Final    Chloride 07/08/2024 104  98 - 107 mmol/L Final    Total CO2 07/08/2024 27.6  22.0 - 29.0 mmol/L Final    Calcium 07/08/2024 9.7  8.6 - 10.5 mg/dL Final    Total Protein 07/08/2024 6.7  6.0 - 8.5 g/dL Final    Albumin 07/08/2024 4.3  3.5 - 5.2 g/dL Final    Globulin 07/08/2024 2.4  gm/dL Final    A/G Ratio 07/08/2024 1.8  g/dL Final    Total Bilirubin 07/08/2024 0.5  0.0 - 1.2 mg/dL Final    Alkaline Phosphatase 07/08/2024 82  39 - 117 U/L Final    AST (SGOT) 07/08/2024 24  1 - 32 U/L Final    ALT (SGPT) 07/08/2024 19  1 - 33 U/L Final    Total Cholesterol 07/08/2024 160  0 - 200 mg/dL Final    Comment: Cholesterol Reference Ranges  (U.S. Department of Health and Human Services ATP III  Classifications)  Desirable          <200 mg/dL  Borderline High    200-239 mg/dL  High Risk          >240 mg/dL  Triglyceride Reference Ranges  (U.S. Department of Health and Human Services ATP III  Classifications)  Normal           <150 mg/dL  Borderline High  150-199 mg/dL  High             200-499 mg/dL  Very High        >500 mg/dL  HDL Reference Ranges  (U.S. Department of Health and Human Services ATP III  Classifications)  Low     <40 mg/dl (major risk factor for CHD)  High    >60 mg/dl ('negative' risk factor for CHD)  LDL Reference Ranges  (U.S. Department of Health and Human Services ATP III  Classifications)  Optimal          <100 mg/dL  Near Optimal     100-129 mg/dL  Borderline High  130-159 mg/dL  High             160-189 mg/dL  Very High        >189 mg/dL      Triglycerides 07/08/2024 182 (H)  0 - 150 mg/dL Final    HDL Cholesterol 07/08/2024 41  40 - 60 mg/dL Final    VLDL Cholesterol Mino  07/08/2024 31  5 - 40 mg/dL Final    LDL Chol Calc (NIH) 07/08/2024 88  0 - 100 mg/dL Final    LDL/HDL RATIO 07/08/2024 2.01   Final    TSH 07/08/2024 0.280  0.270 - 4.200 uIU/mL Final    WBC 07/08/2024 4.48  3.40 - 10.80 10*3/mm3 Final    RBC 07/08/2024 4.74  3.77 - 5.28 10*6/mm3 Final    Hemoglobin 07/08/2024 14.4  12.0 - 15.9 g/dL Final    Hematocrit 07/08/2024 43.4  34.0 - 46.6 % Final    MCV 07/08/2024 91.6  79.0 - 97.0 fL Final    MCH 07/08/2024 30.4  26.6 - 33.0 pg Final    MCHC 07/08/2024 33.2  31.5 - 35.7 g/dL Final    RDW 07/08/2024 12.1 (L)  12.3 - 15.4 % Final    Platelets 07/08/2024 254  140 - 450 10*3/mm3 Final    Neutrophil Rel % 07/08/2024 51.9  42.7 - 76.0 % Final    Lymphocyte Rel % 07/08/2024 33.7  19.6 - 45.3 % Final    Monocyte Rel % 07/08/2024 8.0  5.0 - 12.0 % Final    Eosinophil Rel % 07/08/2024 5.1  0.3 - 6.2 % Final    Basophil Rel % 07/08/2024 1.1  0.0 - 1.5 % Final    Neutrophils Absolute 07/08/2024 2.32  1.70 - 7.00 10*3/mm3 Final    Lymphocytes Absolute 07/08/2024 1.51  0.70 - 3.10 10*3/mm3 Final    Monocytes Absolute 07/08/2024 0.36  0.10 - 0.90 10*3/mm3 Final    Eosinophils Absolute 07/08/2024 0.23  0.00 - 0.40 10*3/mm3 Final    Basophils Absolute 07/08/2024 0.05  0.00 - 0.20 10*3/mm3 Final    Immature Granulocyte Rel % 07/08/2024 0.2  0.0 - 0.5 % Final    Immature Grans Absolute 07/08/2024 0.01  0.00 - 0.05 10*3/mm3 Final    nRBC 07/08/2024 0.0  0.0 - 0.2 /100 WBC Final    Specific Gravity, UA 07/08/2024 1.021  1.005 - 1.030 Final    pH, UA 07/08/2024 7.5  5.0 - 8.0 Final    Color, UA 07/08/2024 Yellow   Final    REFERENCE RANGE: Yellow, Straw    Appearance, UA 07/08/2024 Clear  Clear Final    Leukocytes, UA 07/08/2024 Trace (A)  Negative Final    Protein 07/08/2024 Negative  Negative Final    Glucose, UA 07/08/2024 Negative  Negative Final    Ketones 07/08/2024 Negative  Negative Final    Blood, UA 07/08/2024 Negative  Negative Final    Bilirubin, UA 07/08/2024 Negative  Negative  Final    Urobilinogen, UA 07/08/2024 Comment   Final    Comment: 1.0 E.U./dL  REFERENCE RANGE: 0.2 - 1.0 E.U./dL      Nitrite, UA 07/08/2024 Negative  Negative Final    Free T4 07/08/2024 1.48  0.93 - 1.70 ng/dL Final    Results may be falsely increased if patient taking Biotin.    WBC, UA 07/08/2024 0-2  /HPF Final    REFERENCE RANGE: None Seen, 0-2    RBC, UA 07/08/2024 0-2  /HPF Final    REFERENCE RANGE: None Seen, 0-2    Epithelial Cells (non renal) 07/08/2024 0-2  /HPF Final    REFERENCE RANGE: None Seen, 0-2    Cast Type 07/08/2024 Comment   Final    HYALINE CASTS, UA            None Seen        /LPF       None Seen  N    Bacteria, UA 07/08/2024 Comment  None Seen /HPF Final    None Seen                Assessment and Plan   Diagnoses and all orders for this visit:    1. Medicare annual wellness visit, subsequent (Primary)    2. Essential hypertension    3. Mixed hyperlipidemia    4. Postmenopausal  -     DEXA Bone Density Axial; Future    5. Vertigo  -     Ambulatory Referral to Physical Therapy    HTN is well controlled, continue losartan and hctz   HLD- continue atorvastatin  Fasting blood sugar is slightly elevated at 108, discussed dietary change and will check an A1c at next visit   Vertigo- will refer to vestibular PT . Declined CT/MRI. Last had work up for dizziness in 2022. Pt advised to go to the ER if severe dizziness, headache , weakness, numbness,   HM- dexa ordered         Follow Up   Return in about 6 months (around 1/12/2025) for Recheck, with fasting labs, cmp, lipid, a1c . Or sooner if needed   Patient was given instructions and counseling regarding her condition or for health maintenance advice. Please see specific information pulled into the AVS if appropriate.

## 2024-07-24 ENCOUNTER — HOSPITAL ENCOUNTER (OUTPATIENT)
Dept: BONE DENSITY | Facility: HOSPITAL | Age: 74
Discharge: HOME | End: 2024-07-24
Admitting: NURSE PRACTITIONER
Payer: MEDICARE

## 2024-07-24 DIAGNOSIS — Z78.0 POSTMENOPAUSAL: ICD-10-CM

## 2024-07-24 PROCEDURE — 77080 DXA BONE DENSITY AXIAL: CPT

## 2024-07-25 ENCOUNTER — TELEPHONE (OUTPATIENT)
Dept: INTERNAL MEDICINE | Facility: CLINIC | Age: 74
End: 2024-07-25
Payer: MEDICARE

## 2024-07-25 NOTE — TELEPHONE ENCOUNTER
Please call Amanda and let her know I reviewed her DEXA scan, it shows osteopenia but she is at high risk for fracture. She does qualify for treatment . I reviewed her past records and under [facility] osteoporosis is listed but I cannot find if she has been treated   Recommend she make an appt with me to discuss or can discuss at her next appt  Continue vitamin d and calcium and recommend regular weight bearing exercise

## 2024-07-25 NOTE — TELEPHONE ENCOUNTER
CALLED PATIENT AND SPOKE WITH HER.   NOTIFIED OF RESULTS, SHE HAS NOT EVER HAD TREATMENT.  SCHEDULED FOR 8/19/24 WITH DENISHA.

## 2024-08-07 ENCOUNTER — TELEPHONE (OUTPATIENT)
Dept: ORTHOPEDIC SURGERY | Facility: CLINIC | Age: 74
End: 2024-08-07

## 2024-08-07 NOTE — TELEPHONE ENCOUNTER
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 PT STATES THE APPT NEEDS TO BE AT [facility]. PLEASE CONTACT PT TO SEE IF SHE CAN BE WORKED IN SOONER AT [facility].

## 2024-08-14 ENCOUNTER — HOSPITAL ENCOUNTER (OUTPATIENT)
Dept: PHYSICAL THERAPY | Facility: HOSPITAL | Age: 74
Setting detail: THERAPIES SERIES
Discharge: HOME | End: 2024-08-14
Payer: MEDICARE

## 2024-08-14 DIAGNOSIS — R42 VERTIGO: Primary | ICD-10-CM

## 2024-08-14 PROCEDURE — 97161 PT EVAL LOW COMPLEX 20 MIN: CPT

## 2024-08-14 NOTE — THERAPY EVALUATION
Outpatient Physical Therapy Vestibular Initial Evaluation  [facility]     Patient Name: Amanda Hernandez  : 1950  MRN: [id number]  Today's Date: 2024      Visit Date: 2024    Patient Active Problem List   Diagnosis    Allergic rhinitis    Chronic depressive person    Esophageal reflux    Essential hypertension    Hypothyroidism    Mixed hyperlipidemia    Pyogenic granuloma    Vertigo        Past Medical History:   Diagnosis Date    Allergic 1960    Been most of my life    Arthritis     Arthritis of back     Unsure    Fracture of wrist     Both wrists - as a child    GERD (gastroesophageal reflux disease)     Hip arthrosis     Unsure    Hyperlipidemia  ?    Hypertension  ?    Hyperthyroidism 1964    Knee swelling     Unsure    Tear of meniscus of knee     Unsure of date approx 30 yrs ago    Visual impairment Wear glasses        Past Surgical History:   Procedure Laterality Date    APPENDECTOMY      COLONOSCOPY   ?    HYSTERECTOMY      TONSILLECTOMY   ?         Visit Dx:     ICD-10-CM ICD-9-CM   1. Vertigo  R42 780.4        Patient History       Row Name 24 1000             History    Chief Complaint Dizziness  -MP      Date Current Problem(s) Began --  chronic  -MP      Brief Description of Current Complaint Pt. Presents to clinic for vestibular evaluation. She reports onset of vertigo for quite sometime, several years. She has tried taking medication without benefit, was admitted to hospital 2-3 years ago for cardiac workup and CT scans which were unremarkable per pt. Report. She describes symptoms as sensation of swimming, occurs when she gets out of bed, turning her head quickly, looking down/up, any sudden had movement. The symptoms may persist and she will have to go to sleep, other times it will resolve more quickly. She has a history of allergies, mother had a history of BPPV but she has never been screened for this. Denies any room spinning sensation,  "will have fullness in ears. No tinnitus. She does endorse headaches she relates to sinus pressure, she is medicated for HTN.  -MP      Previous treatment for THIS PROBLEM Medication  -MP      Patient/Caregiver Goals Relief from dizziness;Know what to do to help the symptoms  -MP      Occupation/sports/leisure activities  - work from home  -MP      Patient seeing anyone else for problem(s)? PCP  -MP      What clinical tests have you had for this problem? MRI;CT scan  -MP         Pain     Difficulties at work? yes - will feel her vision gets \"glassy\" after working at computer for some time  -MP      Difficulties with ADL's? yes  -MP         Fall Risk Assessment    Any falls in the past year: Yes  -MP      Number of falls reported in the last 12 months 1  -MP      Factors that contributed to the fall: Other (comment)  stumbled  -MP         Services    Prior Rehab/Home Health Experiences No  -MP         Daily Activities    Primary Language English  -MP      Are you able to read Yes  -MP      Are you able to write Yes  -MP      How does patient learn best? Listening;Reading;Demonstration  -MP      Does patient have problems with the following? None  -MP      Barriers to learning None  -MP      Pt Participated in POC and Goals Yes  -MP                User Key  (r) = Recorded By, (t) = Taken By, (c) = Cosigned By      Initials Name Provider Type    Swathi Boss PT Physical Therapist                     Vestibular Isaias       Row Name 08/14/24 1100             Occulomotor Exam Fixation Present    Occular ROM Normal  -MP      Spontaneous Nystagmus Absent  -MP      Gaze-induced Nystagmus Absent  -MP      Smooth Pursuit Saccadic  -MP      Saccades Intact  -MP         Vestibulo-Occular Reflex (VOR)    VOR to Slow Head Movement Normal  symptomatic  -MP      VOR to Fast Head Movement/Head Thrust Test Normal;Other  symptomatic  -MP         Positional Testing    Positional Testing With infrared goggles  -MP      " Vertebrobasilar Artery Screen - Right Negative  -MP      Vertebrobasilar Artery Screen - Left Negative  -MP      Pravin-Hallpike Right No nystagmus  -MP      Tyler-Hallpike Left No nystagmus  -MP      Horizontal Roll Test Right No nystagmus  -MP      Horizontal Roll Test Left No nystagmus  -MP         General ROM    GENERAL ROM COMMENTS painful c-spine AROM  -MP                User Key  (r) = Recorded By, (t) = Taken By, (c) = Cosigned By      Initials Name Provider Type    Swathi Boss, PT Physical Therapist                                Therapy Education  Education Details: Educated on PT role and POC; purpose of vestibular therapy including anatomy of vestibular system and 3 semi-circular canals; utilized model to enhance understanding. Discussed BPPV vs. Differential diagnoses. VOR HEP [id number]  Given: Symptoms/condition management, HEP  Program: New  How Provided: Verbal, Written, Demonstration  Provided to: Patient  Level of Understanding: Verbalized       OP Exercises       Row Name 08/14/24 1100             Total Minutes    29805 -  PT Neuromuscular Reeducation Minutes 5  -MP         Exercise 1    Exercise Name 1 VORx1  -MP      Cueing 1 Verbal;Demo  -MP      Sets 1 1  -MP      Reps 1 3e  -MP      Time 1 30sec  -MP      Additional Comments seated  -MP                User Key  (r) = Recorded By, (t) = Taken By, (c) = Cosigned By      Initials Name Provider Type    Swathi Boss, PT Physical Therapist                                 PT OP Goals       Row Name 08/14/24 1000          PT Short Term Goals    STG Date to Achieve 09/13/24  -MP     STG 1 Pt. will be independent with initial HEP to improve self-management of condition.  -MP     STG 1 Progress New  -MP     STG 2 Pt. will tolerate initial VOR exercises without provocation to progress to dynamic challenges.  -MP     STG 2 Progress New  -MP        Long Term Goals    LTG Date to Achieve 10/13/24  -MP     LTG 1 Pt. will be independent with advanced  HEP to improve long term management of condition and independence.  -MP     LTG 1 Progress New  -MP     LTG 2 Pt will score </= 14 on DHI (from 34/100 on initial evaluation) to indicate improved perception of disability.  -MP     LTG 2 Progress New  -MP     LTG 3 Pt. will be independent with static fixation to reduce symptoms with transitional movements.  -MP     LTG 3 Progress New  -MP     LTG 4 Pt. will tolerate dynamic VOR without symptom provocation to mimic daily activities.  -MP     LTG 4 Progress New  -MP     LTG 5 Pt. will report 50% improvement in condition to improve QOL.  -MP     LTG 5 Progress New  -MP        Time Calculation    PT Goal Re-Cert Due Date 11/12/24  -MP               User Key  (r) = Recorded By, (t) = Taken By, (c) = Cosigned By      Initials Name Provider Type    Swathi Boss, PT Physical Therapist                     PT Assessment/Plan       Row Name 08/14/24 1000          PT Assessment    Functional Limitations Impaired locomotion;Decreased safety during functional activities;Limitation in home management  -MP     Impairments Balance  -MP     Assessment Comments Amanda Hernandez is a 73 y.o. year-old female referred to physical therapy for vertigo. She presents with a stable clinical presentation. She has comorbidities of osteopenia, HTN, L shoulder pain, OA and personal factors of chronicity of symptoms for several years that may affect her progress in the plan of care. Self scored disability measure of DHI was a 34/100 indicating mild handicap. She demonstrated symptom provocation with VOR testing, pain with end range cervical extension and rotation, (-) BPPV screen. Signs and symptoms are consistent with referring diagnosis. She is appropriate for skilled therapy services at this time to address deficits and improve ease with ADLs.  -MP     Please refer to paper survey for additional self-reported information No  -MP     Rehab Potential Good  -MP     Patient/caregiver participated  in establishment of treatment plan and goals Yes  -MP     Patient would benefit from skilled therapy intervention Yes  -MP        PT Plan    PT Frequency 1x/week  -MP     Predicted Duration of Therapy Intervention (PT) 6 visits  -MP     Planned CPT's? PT RE-EVAL: 66389;PT THER PROC EA 15 MIN: 06271;PT THER ACT EA 15 MIN: 47707;PT MANUAL THERAPY EA 15 MIN: 10252;PT NEUROMUSC RE-EDUCATION EA 15 MIN: 36447;PT GAIT TRAINING EA 15 MIN: 11887;PT SELF CARE/HOME MGMT/TRAIN EA 15: 65353;PT CANALITH REPOSITIONIN;PT EVAL LOW COMPLEXITY: 77842  -MP     PT Plan Comments mCTSIB, FGA, balance, VOR  -MP               User Key  (r) = Recorded By, (t) = Taken By, (c) = Cosigned By      Initials Name Provider Type    Swathi Boss, PT Physical Therapist                               Time Calculation:   Start Time: 1045  Stop Time: 1129  Time Calculation (min): 44 min  Total Timed Code Minutes- PT: 5 minute(s)  Timed Charges  22213 -  PT Neuromuscular Reeducation Minutes: 5  Untimed Charges  PT Eval/Re-eval Minutes: 39  Total Minutes  Timed Charges Total Minutes: 5  Untimed Charges Total Minutes: 39   Total Minutes: 44   Therapy Charges for Today       Code Description Service Date Service Provider Modifiers Qty    96887173647 HC PT EVAL LOW COMPLEXITY 3 2024 Swathi Lovell, PT GP 1                      Swathi Lovell PT  2024

## 2024-08-19 ENCOUNTER — OFFICE VISIT (OUTPATIENT)
Dept: INTERNAL MEDICINE | Facility: CLINIC | Age: 74
End: 2024-08-19
Payer: MEDICARE

## 2024-08-19 VITALS
BODY MASS INDEX: 29.82 KG/M2 | OXYGEN SATURATION: 98 % | HEIGHT: 65 IN | SYSTOLIC BLOOD PRESSURE: 120 MMHG | WEIGHT: 179 LBS | RESPIRATION RATE: 16 BRPM | HEART RATE: 68 BPM | DIASTOLIC BLOOD PRESSURE: 64 MMHG

## 2024-08-19 DIAGNOSIS — M85.80 OSTEOPENIA WITH HIGH RISK OF FRACTURE: Primary | ICD-10-CM

## 2024-08-19 DIAGNOSIS — E55.9 VITAMIN D DEFICIENCY: ICD-10-CM

## 2024-08-19 PROCEDURE — 99213 OFFICE O/P EST LOW 20 MIN: CPT | Performed by: NURSE PRACTITIONER

## 2024-08-19 PROCEDURE — 3074F SYST BP LT 130 MM HG: CPT | Performed by: NURSE PRACTITIONER

## 2024-08-19 PROCEDURE — 3078F DIAST BP <80 MM HG: CPT | Performed by: NURSE PRACTITIONER

## 2024-08-19 PROCEDURE — 1126F AMNT PAIN NOTED NONE PRSNT: CPT | Performed by: NURSE PRACTITIONER

## 2024-08-19 PROCEDURE — 1159F MED LIST DOCD IN RCRD: CPT | Performed by: NURSE PRACTITIONER

## 2024-08-19 PROCEDURE — 1160F RVW MEDS BY RX/DR IN RCRD: CPT | Performed by: NURSE PRACTITIONER

## 2024-08-19 RX ORDER — FLUTICASONE PROPIONATE 50 MCG
2 SPRAY, SUSPENSION (ML) NASAL DAILY
Qty: 48 G | Refills: 3 | Status: SHIPPED | OUTPATIENT
Start: 2024-08-19

## 2024-08-19 RX ORDER — ALENDRONATE SODIUM 70 MG/1
70 TABLET ORAL
Qty: 5 TABLET | Refills: 11 | Status: SHIPPED | OUTPATIENT
Start: 2024-08-19

## 2024-08-19 NOTE — PROGRESS NOTES
Subjective   Amanda Hernandez is a 73 y.o. female. Patient is here today for   Chief Complaint   Patient presents with    Osteopenia   Answers submitted by the patient for this visit:  Other (Submitted on 2024)  Please describe your symptoms.: Requested by Susanne to come in due to results from Bone Density testing.  Have you had these symptoms before?: Yes  How long have you been having these symptoms?: Greater than 2 weeks  Primary Reason for Visit (Submitted on 2024)  What is the primary reason for your visit?: Other         Vitals:    24 0939   BP: 120/64   Pulse: 68   Resp: 16   SpO2: 98%     Body mass index is 29.79 kg/m².  The following portions of the patient's history were reviewed and updated as appropriate: allergies, current medications, past family history, past medical history, past social history, past surgical history and problem list.    Past Medical History:   Diagnosis Date    Allergic 1960    Been most of my life    Arthritis     Arthritis of back     Unsure    Fracture of wrist     Both wrists - as a child    GERD (gastroesophageal reflux disease)     Hip arthrosis     Unsure    Hyperlipidemia  ?    Hypertension  ?    Hyperthyroidism 1964    Knee swelling     Unsure    Tear of meniscus of knee     Unsure of date approx 30 yrs ago    Visual impairment Wear glasses      Allergies   Allergen Reactions    Morphine And Codeine GI Intolerance, Nausea And Vomiting and Nausea Only    Sulfa Antibiotics Nausea And Vomiting      Social History     Socioeconomic History    Marital status: Single   Tobacco Use    Smoking status: Former     Current packs/day: 0.00     Average packs/day: 0.5 packs/day for 2.0 years (1.0 ttl pk-yrs)     Types: Cigarettes     Start date: 1971     Quit date: 1973     Years since quittin.6    Smokeless tobacco: Never   Vaping Use    Vaping status: Never Used   Substance and Sexual Activity    Alcohol use: Not Currently    Drug use: Never     Sexual activity: Not Currently        Current Outpatient Medications:     aspirin 81 MG EC tablet, Take 1 tablet by mouth Daily., Disp: , Rfl:     atorvastatin (LIPITOR) 40 MG tablet, Take 1 tablet by mouth Daily., Disp: 90 tablet, Rfl: 3    Calcium Carbonate-Vitamin D (CALTRATE 600+D PO), Take  by mouth., Disp: , Rfl:     escitalopram (LEXAPRO) 10 MG tablet, Take 1 tablet by mouth Daily., Disp: 90 tablet, Rfl: 3    esomeprazole (nexIUM) 20 MG capsule, Take 1 capsule by mouth Every Morning Before Breakfast., Disp: , Rfl:     fluticasone (FLONASE) 50 MCG/ACT nasal spray, 2 sprays by Each Nare route Daily., Disp: 48 g, Rfl: 3    hydroCHLOROthiazide 25 MG tablet, TAKE 1 TABLET BY MOUTH EVERY MORNING, Disp: 90 tablet, Rfl: 3    levothyroxine (SYNTHROID, LEVOTHROID) 88 MCG tablet, TAKE 1 TABLET BY MOUTH DAILY, Disp: 90 tablet, Rfl: 3    losartan (COZAAR) 100 MG tablet, TAKE 1 TABLET BY MOUTH DAILY, Disp: 90 tablet, Rfl: 3    Omega-3 Fatty Acids (OMEGA 3 PO), Take  by mouth., Disp: , Rfl:     vitamin D3 125 MCG (5000 UT) capsule capsule, Take 1 capsule by mouth Daily., Disp: , Rfl:     alendronate (Fosamax) 70 MG tablet, Take 1 tablet by mouth Every 7 (Seven) Days., Disp: 5 tablet, Rfl: 11     Objective     History of Present Illness  Amanda is a 73 year old female patient who is here for an a follow up for bone density. She recently restarted her calcium supplement   She sees her dentist regularly and has not had any fractures since she was a teen.   I reviewed DEXA scan report with her   Review of Systems    Physical Exam  Vitals and nursing note reviewed.   Constitutional:       General: She is not in acute distress.  Cardiovascular:      Rate and Rhythm: Normal rate.   Pulmonary:      Effort: Pulmonary effort is normal.   Skin:     General: Skin is warm and dry.   Neurological:      Mental Status: She is alert.         Assessment    ASSESSMENT    Problems Addressed this Visit       Osteopenia with high risk of fracture  - Primary    Relevant Medications    alendronate (Fosamax) 70 MG tablet    Other Relevant Orders    Vitamin D,25-Hydroxy    Vitamin D deficiency     Diagnoses         Codes Comments    Osteopenia with high risk of fracture    -  Primary ICD-10-CM: M85.80  ICD-9-CM: 733.90     Vitamin D deficiency     ICD-10-CM: E55.9  ICD-9-CM: 268.9             PLAN  Dexa scan shows osteopenia with 10 year fracture risk for major osteoporotic fx 18% and for hip fx is 8.3%   I reviewed treatments for bone density with the patient.  We discussed the bisphosphonate class of drugs.  I reviewed the side effects which include esophagitis, rare atypical fracture and rare jaw osteonecrosis.  The benefit is a decrease in the risk for fracture.  Reviewed with patient how to take bisphosphonate class of drugs.  They are to be taken with a full class of water first thing in the morning with nothing by mouth for one hour after.   She will take calcium with vitamin d twice daily. Will check vitamin d levels today   She will continue to get regular dental exams.   Discussed falls precautions   She will notify me if she cannot tolerate the medication   Follow up in January as scheduled or sooner if needed

## 2024-08-20 ENCOUNTER — TELEPHONE (OUTPATIENT)
Dept: INTERNAL MEDICINE | Facility: CLINIC | Age: 74
End: 2024-08-20
Payer: MEDICARE

## 2024-08-20 LAB — 25(OH)D3+25(OH)D2 SERPL-MCNC: 52.8 NG/ML (ref 30–100)

## 2024-08-20 NOTE — TELEPHONE ENCOUNTER
Pt informed    ----- Message from Susanne Hernandez sent at 8/20/2024  8:43 AM EDT -----  Please notify patient that her vitamin d is normal

## 2024-09-10 ENCOUNTER — OFFICE VISIT (OUTPATIENT)
Dept: ORTHOPEDIC SURGERY | Facility: CLINIC | Age: 74
End: 2024-09-10
Payer: MEDICARE

## 2024-09-10 VITALS — WEIGHT: 179.8 LBS | HEIGHT: 66 IN | TEMPERATURE: 98 F | BODY MASS INDEX: 28.9 KG/M2

## 2024-09-10 DIAGNOSIS — M25.562 CHRONIC PAIN OF BOTH KNEES: Primary | ICD-10-CM

## 2024-09-10 DIAGNOSIS — M22.2X2 PATELLOFEMORAL ARTHRALGIA OF BOTH KNEES: ICD-10-CM

## 2024-09-10 DIAGNOSIS — M25.561 CHRONIC PAIN OF BOTH KNEES: Primary | ICD-10-CM

## 2024-09-10 DIAGNOSIS — G89.29 CHRONIC PAIN OF BOTH KNEES: Primary | ICD-10-CM

## 2024-09-10 DIAGNOSIS — M22.2X1 PATELLOFEMORAL ARTHRALGIA OF BOTH KNEES: ICD-10-CM

## 2024-09-10 PROCEDURE — 99212 OFFICE O/P EST SF 10 MIN: CPT | Performed by: NURSE PRACTITIONER

## 2024-09-10 PROCEDURE — 1160F RVW MEDS BY RX/DR IN RCRD: CPT | Performed by: NURSE PRACTITIONER

## 2024-09-10 PROCEDURE — 1159F MED LIST DOCD IN RCRD: CPT | Performed by: NURSE PRACTITIONER

## 2024-09-10 NOTE — PROGRESS NOTES
[facility]              Follow Up      Patient Name: Amanda Hernandez  : 1950  Primary Care Physician: Susanne Hernandez APRN        Chief Complaint: Bilateral knee pain    HPI:   Amanda Hernandez is a 74 y.o. year old who presents today for evaluation.  I last saw her approximately 5 months ago with complaints of bilateral knee pain and left shoulder pain.  We have injected all 3 of those joints over the course of a couple of visits.  We were little concerned about some meniscal pathology in the knee but she got great relief in the right knee, the left knee improved but not at 100% and not quite as well as the right.  She stated her shoulder was doing a lot better overall.  She has noticed that some of her pain is started to return overall the shoulder is doing the best out of all 3 joints.  No new events or injuries she is getting ready to leave on a long trip to [place] to help take care of her family.  She wanted to come in for injections today.      Past Medical/Surgical, Social and Family History:  I have reviewed and/or updated pertinent history as noted in the medical record including:  Past Medical History:   Diagnosis Date    Allergic 1960    Been most of my life    Arthritis     Arthritis of back     Unsure    Fracture of wrist     Both wrists - as a child    GERD (gastroesophageal reflux disease)     Hip arthrosis     Unsure    Hyperlipidemia  ?    Hypertension  ?    Hyperthyroidism 1964    Knee swelling     Unsure    Tear of meniscus of knee     Unsure of date approx 30 yrs ago    Visual impairment Wear glasses     Past Surgical History:   Procedure Laterality Date    APPENDECTOMY      COLONOSCOPY   ?    HYSTERECTOMY  1992    TONSILLECTOMY   ?     Social History     Occupational History    Not on file   Tobacco Use    Smoking status: Former     Current packs/day: 0.00     Average packs/day: 0.5 packs/day for 2.0 years (1.0 ttl pk-yrs)     Types: Cigarettes     Start  date: 1971     Quit date: 1973     Years since quittin.7    Smokeless tobacco: Never   Vaping Use    Vaping status: Never Used   Substance and Sexual Activity    Alcohol use: Not Currently    Drug use: Never    Sexual activity: Not Currently          Allergies:   Allergies   Allergen Reactions    Morphine And Codeine GI Intolerance, Nausea And Vomiting and Nausea Only    Sulfa Antibiotics Nausea And Vomiting       Medications:   Home Medications:  Current Outpatient Medications on File Prior to Visit   Medication Sig    alendronate (Fosamax) 70 MG tablet Take 1 tablet by mouth Every 7 (Seven) Days.    aspirin 81 MG EC tablet Take 1 tablet by mouth Daily.    atorvastatin (LIPITOR) 40 MG tablet Take 1 tablet by mouth Daily.    Calcium Carbonate-Vitamin D (CALTRATE 600+D PO) Take  by mouth.    escitalopram (LEXAPRO) 10 MG tablet Take 1 tablet by mouth Daily.    esomeprazole (nexIUM) 20 MG capsule Take 1 capsule by mouth Every Morning Before Breakfast.    fluticasone (FLONASE) 50 MCG/ACT nasal spray 2 sprays by Each Nare route Daily.    hydroCHLOROthiazide 25 MG tablet TAKE 1 TABLET BY MOUTH EVERY MORNING    levothyroxine (SYNTHROID, LEVOTHROID) 88 MCG tablet TAKE 1 TABLET BY MOUTH DAILY    losartan (COZAAR) 100 MG tablet TAKE 1 TABLET BY MOUTH DAILY    Omega-3 Fatty Acids (OMEGA 3 PO) Take  by mouth.    vitamin D3 125 MCG (5000 UT) capsule capsule Take 1 capsule by mouth Daily.     No current facility-administered medications on file prior to visit.         ROS:  ROS negative except as listed in the HPI.    Physical Exam:   74 y.o. female  Body mass index is 29.02 kg/m²., 81.6 kg (179 lb 12.8 oz)  Vitals:    09/10/24 0956   Temp: 98 °F (36.7 °C)     General: Alert, cooperative, appears well and in no observable distress. Appears stated age and BMI as listed above.  HEENT: Normocephalic, atraumatic on external visual inspection.  CV: No significant peripheral edema.  Respiratory: Normal respiratory  effort.  Skin: Warm & well perfused; appropriate skin turgor.  Psych: Appropriate mood & affect.  Neuro: Gross sensation and motor intact in affected extremity/extremities.  Vascular: Peripheral pulses palpable in affected extremity/extremities.     MSK Exam:  Bilateral Knee  No deformity or wounds appreciated. No significant redness or warmth.  Trace effusion noted  Tenderness along the joint line appreciated patellofemoral compartment  ROM 0-130 with pain at terminal motion. +crepitus  Ligamentous exam grossly stable  Quad strength 4-4+/5    Brief hip exam in the affected extremity(ies) grossly unremarkable.  Moves ankle and toes up and down, no significant pain or swelling in the foot, ankle or calf.        Radiology:    No new images were needed at the visit today.     Procedure:   See Procedure Note: The potential risks and benefits of performing a diagnostic and therapeutic injection were discussed with the patient prior to procedure. Risks include, but are not limited to infection, swelling, transient increase in pain, bleeding, bruising. Patient was advised that injections are a diagnostic and therapeutic tool meaning they may not alleviate symptoms at all, or may only provide partial or temporary relief. Injection precautions and aftercare discussed.      [facility]. Data/Labs: N/A    Assessment & Plan:    ICD-10-CM ICD-9-CM   1. Chronic pain of both knees  M25.561 719.46    M25.562 338.29    G89.29    2. Patellofemoral arthralgia of both knees  M22.2X1 719.46    M22.2X2      No orders of the defined types were placed in this encounter.    No orders of the defined types were placed in this encounter.    This is a 74-year-old female with bilateral knee pain.  Still have meniscal pathology in the differential she has a little patellofemoral arthritis I think this is probably more likely the source of her symptoms.  We talked about different options I think doing another injection today is more than reasonable.   Since her knees are bothering her a little more in the shoulder we will do those today and should her shoulder continue to bother her or worsen we could always see her back in the future for that.  For now we will have her follow-up as needed on the knees she does not get good relief would like her to call me and let me know.    Continue with activity modifications as needed/discussed.  Continue ICE and/or HEAT PRN.  Recommend to continue activity as tolerated, focus on quad and hip/core strengthening as well as gentle stretching.  Recommend lowering or maintaining BMI within a healthy range to reduce symptoms.   Patient encouraged to call with questions or concerns prior to follow up.  Will discuss with attending as needed.   Consider additional referrals, work up and/or advanced imaging as indicated or if patient fails to respond to conservative care.    Return if symptoms worsen or fail to improve.        JEFFERSON Ventura    Dictation software was used to complete a portion or all of this note.

## 2024-10-02 ENCOUNTER — HOSPITAL ENCOUNTER (OUTPATIENT)
Dept: PHYSICAL THERAPY | Facility: HOSPITAL | Age: 74
Setting detail: THERAPIES SERIES
Discharge: HOME | End: 2024-10-02
Payer: MEDICARE

## 2024-10-02 DIAGNOSIS — Z74.09 IMPAIRED FUNCTIONAL MOBILITY, BALANCE, GAIT, AND ENDURANCE: ICD-10-CM

## 2024-10-02 DIAGNOSIS — R42 VERTIGO: Primary | ICD-10-CM

## 2024-10-02 PROCEDURE — 97112 NEUROMUSCULAR REEDUCATION: CPT

## 2024-10-02 PROCEDURE — 97140 MANUAL THERAPY 1/> REGIONS: CPT

## 2024-10-02 PROCEDURE — 97110 THERAPEUTIC EXERCISES: CPT

## 2024-10-02 NOTE — THERAPY PROGRESS REPORT/RE-CERT
Outpatient Physical Therapy Vestibular Progress Note  [facility]     Patient Name: Amanda Hernandez  : 1950  MRN: [id number]  Today's Date: 10/2/2024      Visit Date: 10/02/2024    Visit Dx:     ICD-10-CM ICD-9-CM   1. Vertigo  R42 780.4   2. Impaired functional mobility, balance, gait, and endurance  Z74.09 V49.89       Patient Active Problem List   Diagnosis    Allergic rhinitis    Chronic depressive person    Esophageal reflux    Essential hypertension    Hypothyroidism    Mixed hyperlipidemia    Pyogenic granuloma    Vertigo    Osteopenia with high risk of fracture    Vitamin D deficiency                        PT Assessment/Plan       Row Name 10/02/24 1000          PT Assessment    Functional Limitations Impaired locomotion;Decreased safety during functional activities;Limitation in home management  -MP     Impairments Balance  -MP     Assessment Comments Amanda Hernandez has been seen for 1 physical therapy sessions for imbalance and vertigo. Treatment has included therapeutic exercise, neuro-muscular retraining , and patient education with home exercise program . Progress to physical therapy goals is fair as pt. Has met 0/2 STGs, and 0/5 LTGs. At this time limited ability to progress to goals as pt. Returns for first follow up this date. Progressed VOR to standing, added balance challenges as well as ther ex and manual to address cervicogenic component of vertigo. She will benefit from continued skilled physical therapy to address remaining impairments and functional limitations.  -MP     Please refer to paper survey for additional self-reported information No  -MP     Rehab Potential Good  -MP     Patient/caregiver participated in establishment of treatment plan and goals Yes  -MP     Patient would benefit from skilled therapy intervention Yes  -MP        PT Plan    PT Frequency 1x/week  -MP     Predicted Duration of Therapy Intervention (PT) 6 visits  -MP     Planned CPT's? PT RE-EVAL: 65229;PT THER  PROC EA 15 MIN: 21090;PT THER ACT EA 15 MIN: 37665;PT MANUAL THERAPY EA 15 MIN: 92339;PT NEUROMUSC RE-EDUCATION EA 15 MIN: 31457;PT GAIT TRAINING EA 15 MIN: 71319;PT SELF CARE/HOME MGMT/TRAIN EA 15: 67130;PT HOT OR COLD PACK TREAT MCARE;PT ELECTRICAL STIM UNATTEND: ;PT CANALITH REPOSITIONIN  -MP     PT Plan Comments FGA and make goal  -MP               User Key  (r) = Recorded By, (t) = Taken By, (c) = Cosigned By      Initials Name Provider Type    MP Swathi Lovell, PT Physical Therapist                        OP Exercises       Row Name 10/02/24 1000             Subjective    Subjective Comments I have been feeling a little better but still have some dizzy episodes. I am dizzy today  -MP         Subjective Pain    Able to rate subjective pain? yes  -MP      Pre-Treatment Pain Level 0  -MP      Post-Treatment Pain Level 0  -MP         Total Minutes    04231 - PT Therapeutic Exercise Minutes 18  -MP      90934 -  PT Neuromuscular Reeducation Minutes 12  -MP      52163 - PT Manual Therapy Minutes 10  -MP         Exercise 1    Exercise Name 1 NuStep  -MP      Cueing 1 Verbal  -MP      Time 1 5 min  -MP         Exercise 2    Exercise Name 2 VORx1  -MP      Cueing 2 Verbal;Demo  -MP      Sets 2 1  -MP      Reps 2 3e  -MP      Time 2 30sec  -MP      Additional Comments standing  -MP         Exercise 3    Exercise Name 3 tandem walk  -MP      Cueing 3 Verbal;Demo  -MP      Reps 3 3 laps  -MP      Time 3 fwd  -MP         Exercise 4    Exercise Name 4 rhomberg EC  -MP      Cueing 4 Verbal;Demo  -MP      Reps 4 3  -MP      Time 4 30sec  -MP         Exercise 5    Exercise Name 5 cervical SNAG rotation  -MP      Cueing 5 Verbal;Demo  -MP      Reps 5 10ea  -MP      Time 5 5s  -MP         Exercise 6    Exercise Name 6 cervical SNAG extension  -MP      Cueing 6 Verbal;Demo  -MP      Reps 6 10  -MP      Time 6 5s  -MP                User Key  (r) = Recorded By, (t) = Taken By, (c) = Cosigned By      Initials Name  Provider Type    Swathi Boss, MURALI Physical Therapist                  Manual Rx (Last 36 Hours)       Manual Treatments       Row Name 10/02/24 1000             Total Minutes    79982 - PT Manual Therapy Minutes 10  -MP         Manual Rx 1    Manual Rx 1 Location seated STM B UT/LS gentle c-spine distraction  -MP                User Key  (r) = Recorded By, (t) = Taken By, (c) = Cosigned By      Initials Name Provider Type    Swathi Boss PT Physical Therapist                               PT OP Goals       Row Name 10/02/24 1000          PT Short Term Goals    STG Date to Achieve 09/13/24  -MP     STG 1 Pt. will be independent with initial HEP to improve self-management of condition.  -MP     STG 1 Progress Ongoing  -MP     STG 1 Progress Comments only 1 visit and not performing consistently due to being out of town  -MP     STG 2 Pt. will tolerate initial VOR exercises without provocation to progress to dynamic challenges.  -MP     STG 2 Progress Ongoing  -MP        Long Term Goals    LTG Date to Achieve 10/13/24  -MP     LTG 1 Pt. will be independent with advanced HEP to improve long term management of condition and independence.  -MP     LTG 1 Progress Ongoing  -MP     LTG 2 Pt will score </= 14 on DHI (from 34/100 on initial evaluation) to indicate improved perception of disability.  -MP     LTG 2 Progress Ongoing  -MP     LTG 3 Pt. will be independent with static fixation to reduce symptoms with transitional movements.  -MP     LTG 3 Progress Ongoing  -MP     LTG 4 Pt. will tolerate dynamic VOR without symptom provocation to mimic daily activities.  -MP     LTG 4 Progress Ongoing  -MP     LTG 5 Pt. will report 50% improvement in condition to improve QOL.  -MP     LTG 5 Progress Ongoing  -MP               User Key  (r) = Recorded By, (t) = Taken By, (c) = Cosigned By      Initials Name Provider Type    Swathi Boss PT Physical Therapist                    Therapy Education  Education  Details: [id number] updated HEP  Given: HEP, Symptoms/condition management  Program: Reinforced, Progressed  How Provided: Verbal, Demonstration, Written  Provided to: Patient  Level of Understanding: Verbalized, Demonstrated              Time Calculation:   Start Time: 1043  Stop Time: 1123  Time Calculation (min): 40 min  Total Timed Code Minutes- PT: 40 minute(s)  Timed Charges  64379 - PT Therapeutic Exercise Minutes: 18  00927 -  PT Neuromuscular Reeducation Minutes: 12  24916 - PT Manual Therapy Minutes: 10  Total Minutes  Timed Charges Total Minutes: 40   Total Minutes: 40   Therapy Charges for Today       Code Description Service Date Service Provider Modifiers Qty    46321058845 HC PT MANUAL THERAPY EA 15 MIN 10/2/2024 Swathi Lovell, PT GP 1    72914911990 HC PT THER PROC EA 15 MIN 10/2/2024 Swathi Lovell, PT GP 1    [id number]  PT NEUROMUSC RE EDUCATION EA 15 MIN 10/2/2024 Swathi Lovell, PT GP 1                     Swathi Lovell PT  10/2/2024

## 2024-10-10 ENCOUNTER — HOSPITAL ENCOUNTER (OUTPATIENT)
Dept: PHYSICAL THERAPY | Facility: HOSPITAL | Age: 74
Setting detail: THERAPIES SERIES
Discharge: HOME | End: 2024-10-10
Payer: MEDICARE

## 2024-10-10 DIAGNOSIS — Z74.09 IMPAIRED FUNCTIONAL MOBILITY, BALANCE, GAIT, AND ENDURANCE: ICD-10-CM

## 2024-10-10 DIAGNOSIS — M25.561 ACUTE PAIN OF RIGHT KNEE: ICD-10-CM

## 2024-10-10 DIAGNOSIS — R42 VERTIGO: Primary | ICD-10-CM

## 2024-10-10 PROCEDURE — 97140 MANUAL THERAPY 1/> REGIONS: CPT

## 2024-10-10 PROCEDURE — 97110 THERAPEUTIC EXERCISES: CPT

## 2024-10-10 PROCEDURE — 97112 NEUROMUSCULAR REEDUCATION: CPT

## 2024-10-10 NOTE — THERAPY TREATMENT NOTE
Outpatient Physical Therapy Vestibular Treatment Note  [facility]     Patient Name: Amanda Hernandez  : 1950  MRN: [id number]  Today's Date: 10/10/2024      Visit Date: 10/10/2024    Visit Dx:     ICD-10-CM ICD-9-CM   1. Vertigo  R42 780.4   2. Impaired functional mobility, balance, gait, and endurance  Z74.09 V49.89   3. Acute pain of right knee  M25.561 719.46       Patient Active Problem List   Diagnosis    Allergic rhinitis    Chronic depressive person    Esophageal reflux    Essential hypertension    Hypothyroidism    Mixed hyperlipidemia    Pyogenic granuloma    Vertigo    Osteopenia with high risk of fracture    Vitamin D deficiency                        PT Assessment/Plan       Row Name 10/10/24 1100 10/10/24 1000       PT Assessment    Assessment Comments --  -MP Amanda Hernandez arrives to clinic reporting increased/return of symptoms over last few days she relates to potential sinus infection. Felt mild increase in symptoms immediately following last appointment but brief improvement for 1-2 days after. Continued addressing vestibular component and cervicogenic component contributing to veritgo. Added semi-tandem with EC, provided CGA for balance correction as well as STS + march with imbalance on initial reps though improved with increased repetitions. Pt. remains appropriate for skilled PT.  -MP       PT Plan    PT Plan Comments -- forward bending, VOR walking  -MP              User Key  (r) = Recorded By, (t) = Taken By, (c) = Cosigned By      Initials Name Provider Type    Swathi Boss, PT Physical Therapist                        OP Exercises       Row Name 10/10/24 1000             Subjective    Subjective Comments I think I have a bit of a sinus infection going on so its hard to tell if thats why I am feeling more of those episodes  -MP         Subjective Pain    Able to rate subjective pain? yes  -MP      Pre-Treatment Pain Level 0  -MP      Post-Treatment Pain Level 0  -MP          Total Minutes    18143 - PT Therapeutic Exercise Minutes 15  -MP      55821 -  PT Neuromuscular Reeducation Minutes 15  -MP      76390 - PT Manual Therapy Minutes 10  -MP         Exercise 1    Exercise Name 1 NuStep  -MP      Cueing 1 Verbal  -MP      Time 1 5 min  -MP         Exercise 2    Exercise Name 2 VORx1  -MP      Cueing 2 Verbal;Demo  -MP      Sets 2 1  -MP      Reps 2 3e  -MP      Time 2 30sec  -MP      Additional Comments standing  -MP         Exercise 3    Exercise Name 3 tandem walk  -MP      Cueing 3 Verbal;Demo  -MP      Reps 3 3 laps  -MP      Time 3 fwd  -MP         Exercise 4    Exercise Name 4 rhomberg EC  -MP      Cueing 4 Verbal;Demo  -MP      Reps 4 3  -MP      Time 4 30sec  -MP         Exercise 5    Exercise Name 5 cervical SNAG rotation  -MP      Cueing 5 Verbal;Demo  -MP      Reps 5 10ea  -MP      Time 5 5s  -MP         Exercise 6    Exercise Name 6 cervical SNAG extension  -MP      Cueing 6 Verbal;Demo  -MP      Reps 6 10  -MP      Time 6 5s  -MP         Exercise 7    Exercise Name 7 semi-tandem EC  -MP      Cueing 7 Verbal;Demo  -MP      Reps 7 2e  -MP      Time 7 30sec  -MP         Exercise 8    Exercise Name 8 STS + march  -MP      Cueing 8 Verbal;Demo  -MP      Reps 8 6  -MP                User Key  (r) = Recorded By, (t) = Taken By, (c) = Cosigned By      Initials Name Provider Type    MP Swathi Lovell, PT Physical Therapist                  Manual Rx (Last 36 Hours)       Manual Treatments       Row Name 10/10/24 1000             Total Minutes    13200 - PT Manual Therapy Minutes 10  -MP         Manual Rx 1    Manual Rx 1 Location seated STM B UT/LS gentle c-spine distraction  -MP                User Key  (r) = Recorded By, (t) = Taken By, (c) = Cosigned By      Initials Name Provider Type    MP Swathi Lovell, PT Physical Therapist                               PT OP Goals       Row Name 10/10/24 1000          PT Short Term Goals    STG Date to Achieve 09/13/24  -MP     STG 1  Pt. will be independent with initial HEP to improve self-management of condition.  -MP     STG 1 Progress Ongoing  -MP     STG 2 Pt. will tolerate initial VOR exercises without provocation to progress to dynamic challenges.  -MP     STG 2 Progress Ongoing  -MP        Long Term Goals    LTG Date to Achieve 10/13/24  -MP     LTG 1 Pt. will be independent with advanced HEP to improve long term management of condition and independence.  -MP     LTG 1 Progress Ongoing  -MP     LTG 2 Pt will score </= 14 on DHI (from 34/100 on initial evaluation) to indicate improved perception of disability.  -MP     LTG 2 Progress Ongoing  -MP     LTG 3 Pt. will be independent with static fixation to reduce symptoms with transitional movements.  -MP     LTG 3 Progress Ongoing  -MP     LTG 4 Pt. will tolerate dynamic VOR without symptom provocation to mimic daily activities.  -MP     LTG 4 Progress Ongoing  -MP     LTG 5 Pt. will report 50% improvement in condition to improve QOL.  -MP     LTG 5 Progress Ongoing  -MP               User Key  (r) = Recorded By, (t) = Taken By, (c) = Cosigned By      Initials Name Provider Type    Swathi Boss, PT Physical Therapist                    Therapy Education  Education Details: Updated HEP [id number]  Given: HEP, Symptoms/condition management  Program: Reinforced, Progressed  How Provided: Demonstration, Verbal, Written  Provided to: Patient  Level of Understanding: Verbalized, Demonstrated              Time Calculation:   Start Time: 1044  Stop Time: 1124  Time Calculation (min): 40 min  Total Timed Code Minutes- PT: 40 minute(s)  Timed Charges  86493 - PT Therapeutic Exercise Minutes: 15  33565 -  PT Neuromuscular Reeducation Minutes: 15  81958 - PT Manual Therapy Minutes: 10  Total Minutes  Timed Charges Total Minutes: 40   Total Minutes: 40   Therapy Charges for Today       Code Description Service Date Service Provider Modifiers Qty    20023397777 HC PT MANUAL THERAPY EA 15 MIN  10/10/2024 Swathi Lovell, PT GP 1    94674539954 HC PT THER PROC EA 15 MIN 10/10/2024 Swathi Lovell, PT GP 1    51772359769 HC PT NEUROMUSC RE EDUCATION EA 15 MIN 10/10/2024 Swathi Lovell, PT GP 1                     Swathi Lovell, PT  10/10/2024

## 2024-10-16 ENCOUNTER — HOSPITAL ENCOUNTER (OUTPATIENT)
Dept: PHYSICAL THERAPY | Facility: HOSPITAL | Age: 74
Setting detail: THERAPIES SERIES
Discharge: HOME | End: 2024-10-16
Payer: MEDICARE

## 2024-10-16 DIAGNOSIS — R42 VERTIGO: Primary | ICD-10-CM

## 2024-10-16 DIAGNOSIS — Z74.09 IMPAIRED FUNCTIONAL MOBILITY, BALANCE, GAIT, AND ENDURANCE: ICD-10-CM

## 2024-10-16 PROCEDURE — 97140 MANUAL THERAPY 1/> REGIONS: CPT

## 2024-10-16 PROCEDURE — 97110 THERAPEUTIC EXERCISES: CPT

## 2024-10-16 NOTE — THERAPY TREATMENT NOTE
Outpatient Physical Therapy Vestibular Treatment Note  [facility]     Patient Name: Amanda Hernandez  : 1950  MRN: [id number]  Today's Date: 10/16/2024      Visit Date: 10/16/2024    Visit Dx:     ICD-10-CM ICD-9-CM   1. Vertigo  R42 780.4   2. Impaired functional mobility, balance, gait, and endurance  Z74.09 V49.89       Patient Active Problem List   Diagnosis    Allergic rhinitis    Chronic depressive person    Esophageal reflux    Essential hypertension    Hypothyroidism    Mixed hyperlipidemia    Pyogenic granuloma    Vertigo    Osteopenia with high risk of fracture    Vitamin D deficiency                        PT Assessment/Plan       Row Name 10/16/24 1000          PT Assessment    Assessment Comments Amanda Hernandez arrives to clinic reporting increased sinus pressure resulting in headache/neck pain yesterday with dizziness mostly unchanged.  Due to increased neck pain focused on c-spine this date, increased time spent on manual and added LS stretch with cueing for smaller range for better tolerance.  -MP        PT Plan    PT Plan Comments UT stretch?  -MP               User Key  (r) = Recorded By, (t) = Taken By, (c) = Cosigned By      Initials Name Provider Type    Swathi Boss, PT Physical Therapist                        OP Exercises       Row Name 10/16/24 1000             Subjective    Subjective Comments I think its my sinuses, I had a headache and more sinus pressure and neck pain yesterday, dizziness comes and goes but I dont know what causes it  -MP         Subjective Pain    Able to rate subjective pain? yes  -MP      Pre-Treatment Pain Level 0  -MP      Post-Treatment Pain Level 0  -MP         Total Minutes    49786 - PT Therapeutic Exercise Minutes 20  -MP      63734 - PT Manual Therapy Minutes 20  -MP         Exercise 1    Exercise Name 1 NuStep  -MP      Cueing 1 Verbal  -MP      Time 1 5 min  -MP         Exercise 5    Exercise Name 5 cervical SNAG rotation  -MP      Cueing 5  Verbal;Demo  -MP      Reps 5 10ea  -MP      Time 5 5s  -MP         Exercise 6    Exercise Name 6 cervical SNAG extension  -MP      Cueing 6 Verbal;Demo  -MP      Reps 6 10  -MP      Time 6 5s  -MP         Exercise 9    Exercise Name 9 LS stretch  -MP      Cueing 9 Verbal;Demo  -MP      Reps 9 3e  -MP      Time 9 30sec  -MP                User Key  (r) = Recorded By, (t) = Taken By, (c) = Cosigned By      Initials Name Provider Type    Swathi Boss, PT Physical Therapist                  Manual Rx (Last 36 Hours)       Manual Treatments       Row Name 10/16/24 1000             Total Minutes    02103 - PT Manual Therapy Minutes 20  -MP         Manual Rx 1    Manual Rx 1 Location seated STM B UT/LS gentle c-spine distraction  -MP                User Key  (r) = Recorded By, (t) = Taken By, (c) = Cosigned By      Initials Name Provider Type    MP Swathi Lovell, PT Physical Therapist                               PT OP Goals       Row Name 10/16/24 1000          PT Short Term Goals    STG Date to Achieve 09/13/24  -MP     STG 1 Pt. will be independent with initial HEP to improve self-management of condition.  -MP     STG 1 Progress Ongoing  -MP     STG 2 Pt. will tolerate initial VOR exercises without provocation to progress to dynamic challenges.  -MP     STG 2 Progress Ongoing  -MP        Long Term Goals    LTG Date to Achieve 10/13/24  -MP     LTG 1 Pt. will be independent with advanced HEP to improve long term management of condition and independence.  -MP     LTG 1 Progress Ongoing  -MP     LTG 2 Pt will score </= 14 on DHI (from 34/100 on initial evaluation) to indicate improved perception of disability.  -MP     LTG 2 Progress Ongoing  -MP     LTG 3 Pt. will be independent with static fixation to reduce symptoms with transitional movements.  -MP     LTG 3 Progress Ongoing  -MP     LTG 4 Pt. will tolerate dynamic VOR without symptom provocation to mimic daily activities.  -MP     LTG 4 Progress Ongoing   -MP     LTG 5 Pt. will report 50% improvement in condition to improve QOL.  -MP     LTG 5 Progress Ongoing  -MP               User Key  (r) = Recorded By, (t) = Taken By, (c) = Cosigned By      Initials Name Provider Type    Swathi Boss PT Physical Therapist                    Therapy Education  Given: Symptoms/condition management  Program: Reinforced  How Provided: Verbal, Demonstration  Provided to: Patient  Level of Understanding: Verbalized, Demonstrated              Time Calculation:   Start Time: 1045  Stop Time: 1125  Time Calculation (min): 40 min  Total Timed Code Minutes- PT: 40 minute(s)  Timed Charges  97144 - PT Therapeutic Exercise Minutes: 20  43535 - PT Manual Therapy Minutes: 20  Total Minutes  Timed Charges Total Minutes: 40   Total Minutes: 40   Therapy Charges for Today       Code Description Service Date Service Provider Modifiers Qty    24071866676 HC PT THER PROC EA 15 MIN 10/16/2024 Swathi Lovell, PT GP 2    34489797289 HC PT MANUAL THERAPY EA 15 MIN 10/16/2024 Swathi Lovell, MURALI GP 1                     Swathi Lovell PT  10/16/2024

## 2024-10-31 ENCOUNTER — HOSPITAL ENCOUNTER (OUTPATIENT)
Dept: PHYSICAL THERAPY | Facility: HOSPITAL | Age: 74
Setting detail: THERAPIES SERIES
Discharge: HOME | End: 2024-10-31
Payer: MEDICARE

## 2024-10-31 DIAGNOSIS — R42 VERTIGO: Primary | ICD-10-CM

## 2024-10-31 DIAGNOSIS — Z74.09 IMPAIRED FUNCTIONAL MOBILITY, BALANCE, GAIT, AND ENDURANCE: ICD-10-CM

## 2024-10-31 PROCEDURE — 97110 THERAPEUTIC EXERCISES: CPT

## 2024-10-31 PROCEDURE — 97140 MANUAL THERAPY 1/> REGIONS: CPT

## 2024-10-31 NOTE — THERAPY PROGRESS REPORT/RE-CERT
Outpatient Physical Therapy Vestibular Progress Note  [facility]     Patient Name: Amanda Hernandez  : 1950  MRN: [id number]  Today's Date: 10/31/2024      Visit Date: 10/31/2024    Visit Dx:     ICD-10-CM ICD-9-CM   1. Vertigo  R42 780.4   2. Impaired functional mobility, balance, gait, and endurance  Z74.09 V49.89       Patient Active Problem List   Diagnosis    Allergic rhinitis    Chronic depressive person    Esophageal reflux    Essential hypertension    Hypothyroidism    Mixed hyperlipidemia    Pyogenic granuloma    Vertigo    Osteopenia with high risk of fracture    Vitamin D deficiency                        PT Assessment/Plan       Row Name 10/31/24 1000          PT Assessment    Functional Limitations Impaired locomotion;Decreased safety during functional activities;Limitation in home management  -MP     Impairments Balance  -MP     Assessment Comments Amanda Hernandez has been seen for 5 physical therapy sessions for imbalance and vertigo. Treatment has included therapeutic exercise, manual therapy, neuro-muscular retraining , and patient education with home exercise program . Progress to physical therapy goals is fair as pt. Has met 2/2 STGs, and 0/5 LTGs. Sessions have focused on addressing cervicogenic component as well as vestibular retraining. Pt. Reports possible improvement in symptoms for short duration following appointments, noted increased symptoms after high stress situation this week that improved back toward baseline before appointment. She will benefit from continued skilled physical therapy to address remaining impairments and functional limitations.  -MP     Please refer to paper survey for additional self-reported information No  -MP     Rehab Potential Good  -MP     Patient/caregiver participated in establishment of treatment plan and goals Yes  -MP     Patient would benefit from skilled therapy intervention Yes  -MP        PT Plan    PT Frequency 1x/week  -MP     Predicted Duration  of Therapy Intervention (PT) 4 visits  -MP     Planned CPT's? PT RE-EVAL: 25722;PT THER PROC EA 15 MIN: 64148;PT THER ACT EA 15 MIN: 02544;PT MANUAL THERAPY EA 15 MIN: 47342;PT NEUROMUSC RE-EDUCATION EA 15 MIN: 76891;PT GAIT TRAINING EA 15 MIN: 96386;PT HOT OR COLD PACK TREAT [facility];PT SELF CARE/HOME MGMT/TRAIN EA 15: 74144;PT ELECTRICAL STIM UNATTEND: ;PT TRACTION CERVICAL: 75457;PT ULTRASOUND EA 15 MIN: 99773  -MP     PT Plan Comments update HEP; resume VOR  -MP               User Key  (r) = Recorded By, (t) = Taken By, (c) = Cosigned By      Initials Name Provider Type    Swathi Boss PT Physical Therapist                        OP Exercises       Row Name 10/31/24 1000             Subjective    Subjective Comments I had one of the worst days, I almost passed out but it was after 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 I was very aggravated and afterwards I almost passed out. But before then I think it was a little better after last time.  -MP         Subjective Pain    Able to rate subjective pain? yes  -MP      Pre-Treatment Pain Level 0  -MP      Post-Treatment Pain Level 0  -MP         Total Minutes    35385 - PT Therapeutic Exercise Minutes 15  -MP      40247 - PT Manual Therapy Minutes 23  -MP         Exercise 1    Exercise Name 1 NuStep  -MP      Cueing 1 Verbal  -MP      Time 1 5 min  -MP         Exercise 9    Exercise Name 9 LS stretch  -MP      Cueing 9 Verbal;Demo  -MP      Reps 9 3e  -MP      Time 9 30sec  -MP         Exercise 10    Exercise Name 10 supine chin tucks  -MP      Cueing 10 Verbal;Demo  -MP      Reps 10 15  -MP      Time 10 5s  -MP         Exercise 11    Exercise Name 11 chin tucks+ rotation  -MP      Cueing 11 Verbal;Demo  -MP      Reps 11 10ea  -MP                User Key  (r) = Recorded By, (t) = Taken By, (c) = Cosigned By      Initials Name Provider Type    Swathi Boss PT Physical Therapist                   Manual Rx (Last 36 Hours)       Manual Treatments       Row Name 10/31/24 1000             Total Minutes    21886 - PT Manual Therapy Minutes 23  -MP         Manual Rx 1    Manual Rx 1 Location seated and supine STM B UT/LS gentle c-spine distraction, UT stretch  -MP                User Key  (r) = Recorded By, (t) = Taken By, (c) = Cosigned By      Initials Name Provider Type    Swathi Boss, PT Physical Therapist                               PT OP Goals       Row Name 10/31/24 1000          PT Short Term Goals    STG Date to Achieve 09/13/24  -MP     STG 1 Pt. will be independent with initial HEP to improve self-management of condition.  -MP     STG 1 Progress Met  -MP     STG 1 Progress Comments reports compliance  -MP     STG 2 Pt. will tolerate initial VOR exercises without provocation to progress to dynamic challenges.  -MP     STG 2 Progress Met  -MP     STG 2 Progress Comments performed in standing  -MP        Long Term Goals    LTG Date to Achieve 10/13/24  -MP     LTG 1 Pt. will be independent with advanced HEP to improve long term management of condition and independence.  -MP     LTG 1 Progress Ongoing  -MP     LTG 2 Pt will score </= 14 on DHI (from 34/100 on initial evaluation) to indicate improved perception of disability.  -MP     LTG 2 Progress Ongoing  -MP     LTG 3 Pt. will be independent with static fixation to reduce symptoms with transitional movements.  -MP     LTG 3 Progress Ongoing  -MP     LTG 4 Pt. will tolerate dynamic VOR without symptom provocation to mimic daily activities.  -MP     LTG 4 Progress Ongoing  -MP     LTG 5 Pt. will report 50% improvement in condition to improve QOL.  -MP     LTG 5 Progress Ongoing  -MP     LTG 5 Progress Comments unable to quantify if any improvement at this time  -MP               User Key  (r) = Recorded By, (t) = Taken By, (c) = Cosigned By      Initials Name Provider Type    Swathi Boss, PT Physical Therapist                     Therapy Education  Given: Symptoms/condition management, Posture/body mechanics  Program: Reinforced  How Provided: Verbal, Demonstration  Provided to: Patient  Level of Understanding: Verbalized, Demonstrated              Time Calculation:   Start Time: 1045  Stop Time: 1123  Time Calculation (min): 38 min  Total Timed Code Minutes- PT: 38 minute(s)  Timed Charges  91218 - PT Therapeutic Exercise Minutes: 15  30808 - PT Manual Therapy Minutes: 23  Total Minutes  Timed Charges Total Minutes: 38   Total Minutes: 38   Therapy Charges for Today       Code Description Service Date Service Provider Modifiers Qty    82741978187 HC PT MANUAL THERAPY EA 15 MIN 10/31/2024 Swathi Lovell, PT GP, KX 2    73296620432 HC PT THER PROC EA 15 MIN 10/31/2024 Swathi Lovell, PT GP, KX 1                     Swathi Lovell PT  10/31/2024

## 2024-11-07 ENCOUNTER — HOSPITAL ENCOUNTER (OUTPATIENT)
Dept: PHYSICAL THERAPY | Facility: HOSPITAL | Age: 74
Setting detail: THERAPIES SERIES
Discharge: HOME | End: 2024-11-07
Payer: MEDICARE

## 2024-11-07 DIAGNOSIS — R42 VERTIGO: Primary | ICD-10-CM

## 2024-11-07 DIAGNOSIS — Z74.09 IMPAIRED FUNCTIONAL MOBILITY, BALANCE, GAIT, AND ENDURANCE: ICD-10-CM

## 2024-11-07 DIAGNOSIS — M25.561 ACUTE PAIN OF RIGHT KNEE: ICD-10-CM

## 2024-11-07 PROCEDURE — 97140 MANUAL THERAPY 1/> REGIONS: CPT

## 2024-11-07 PROCEDURE — 97110 THERAPEUTIC EXERCISES: CPT

## 2024-11-07 RX ORDER — ESCITALOPRAM OXALATE 10 MG/1
10 TABLET ORAL DAILY
Qty: 90 TABLET | Refills: 3 | Status: SHIPPED | OUTPATIENT
Start: 2024-11-07

## 2024-11-07 NOTE — THERAPY DISCHARGE NOTE
"   Outpatient Physical Therapy Vestibular Treatment Note/Discharge Summary  [facility]     Patient Name: Amanda Hernandez  : 1950  MRN: [id number]  Today's Date: 2024      Visit Date: 2024    Visit Dx:     ICD-10-CM ICD-9-CM   1. Vertigo  R42 780.4   2. Impaired functional mobility, balance, gait, and endurance  Z74.09 V49.89   3. Acute pain of right knee  M25.561 719.46       Patient Active Problem List   Diagnosis    Allergic rhinitis    Chronic depressive person    Esophageal reflux    Essential hypertension    Hypothyroidism    Mixed hyperlipidemia    Pyogenic granuloma    Vertigo    Osteopenia with high risk of fracture    Vitamin D deficiency                         PT Assessment/Plan       Row Name 24 1100          PT Assessment    Assessment Comments Amanda Hernandez was seen for 6 physical therapy sessions for imbalance and vertigo. Treatment included therapeutic exercise, manual therapy, therapeutic activity, neuro-muscular retraining , and patient education with home exercise program . Progress to physical therapy goals was slow as pt. Met 2/2 STGs and 2/5 LTGs. Overall pt. Has tolerated exercise well without exacerbation in clinic, however, continues with intermittent dizzy \"spells\" that pt. Is unable to correlate to any provoking factor as well as persistent soreness/stiffness in c-spine for 2 days following PT appointments. She is independent with HEP and advised pt. Continue with program but will hold on further PT appointments secondary to limited progress. She was discharged to an independent HEP and provided patient education to self-manage condition.  -MP        PT Plan    PT Plan Comments D/C  -MP               User Key  (r) = Recorded By, (t) = Taken By, (c) = Cosigned By      Initials Name Provider Type    Swathi Boss, PT Physical Therapist                          OP Exercises       Row Name 24 1100 24 1000          Subjective    Subjective Comments -- I " tried to pay attention this time and I can't say I notice any change. I am always sore for 2 days after and then it gets better but also still having dizzy spells that I can't correlate to anything  -MP        Subjective Pain    Able to rate subjective pain? -- yes  -MP     Pre-Treatment Pain Level -- 0  -MP     Post-Treatment Pain Level -- 0  -MP        Total Minutes    31854 - PT Therapeutic Exercise Minutes 15  -MP --  -MP     30589 - PT Manual Therapy Minutes 23  -MP --        Exercise 1    Exercise Name 1 -- NuStep  -MP     Cueing 1 -- Verbal  -MP     Time 1 -- 5 min  -MP        Exercise 5    Exercise Name 5 -- cervical SNAG rotation  -MP     Cueing 5 -- Verbal;Demo  -MP     Reps 5 -- 10ea  -MP     Time 5 -- 5s  -MP        Exercise 6    Exercise Name 6 -- cervical SNAG extension  -MP     Cueing 6 -- Verbal;Demo  -MP     Reps 6 -- 10  -MP     Time 6 -- 5s  -MP               User Key  (r) = Recorded By, (t) = Taken By, (c) = Cosigned By      Initials Name Provider Type    Swathi Boss, PT Physical Therapist                  Manual Rx (Last 36 Hours)       Manual Treatments       Row Name 11/07/24 1100             Total Minutes    47413 - PT Manual Therapy Minutes 23  -MP         Manual Rx 1    Manual Rx 1 Location seated  STM B UT/LS gentle c-spine distraction, UT stretch  -MP                User Key  (r) = Recorded By, (t) = Taken By, (c) = Cosigned By      Initials Name Provider Type    Swathi Boss, PT Physical Therapist                                 PT OP Goals       Row Name 11/07/24 1000          PT Short Term Goals    STG Date to Achieve 09/13/24  -MP     STG 1 Pt. will be independent with initial HEP to improve self-management of condition.  -MP     STG 1 Progress Met  -MP     STG 2 Pt. will tolerate initial VOR exercises without provocation to progress to dynamic challenges.  -MP     STG 2 Progress Met  -MP        Long Term Goals    LTG Date to Achieve 10/13/24  -MP     LTG 1 Pt. will be  independent with advanced HEP to improve long term management of condition and independence.  -MP     LTG 1 Progress Met  -MP     LTG 1 Progress Comments reports compliance  -MP     LTG 2 Pt will score </= 14 on DHI (from 34/100 on initial evaluation) to indicate improved perception of disability.  -MP     LTG 2 Progress Not Met  -MP     LTG 3 Pt. will be independent with static fixation to reduce symptoms with transitional movements.  -MP     LTG 3 Progress Met  -MP     LTG 4 Pt. will tolerate dynamic VOR without symptom provocation to mimic daily activities.  -MP     LTG 4 Progress Not Met  -MP     LTG 4 Progress Comments VORx1 in standing  -MP     LTG 5 Pt. will report 50% improvement in condition to improve QOL.  -MP     LTG 5 Progress Not Met  -MP     LTG 5 Progress Comments no change  -MP               User Key  (r) = Recorded By, (t) = Taken By, (c) = Cosigned By      Initials Name Provider Type    Swathi Boss, PT Physical Therapist                    Therapy Education  Education Details: reviewed HEP  Given: HEP, Symptoms/condition management  Program: Reinforced  How Provided: Verbal, Demonstration  Provided to: Patient  Level of Understanding: Verbalized, Demonstrated    Outcome Measure Options: Dizziness Handicap Inventory         Time Calculation:   Start Time: 1050  Stop Time: 1128  Time Calculation (min): 38 min  Total Timed Code Minutes- PT: 38 minute(s)  Timed Charges  46799 - PT Therapeutic Exercise Minutes: 15  20990 - PT Manual Therapy Minutes: 23  Total Minutes  Timed Charges Total Minutes: 38   Total Minutes: 38     Therapy Charges for Today       Code Description Service Date Service Provider Modifiers Qty    08860969425 HC PT MANUAL THERAPY EA 15 MIN 11/7/2024 Swathi Lovell, PT GP 2    01722354141 HC PT THER PROC EA 15 MIN 11/7/2024 Swathi Lovell, PT GP 2            PT G-Codes  Outcome Measure Options: Dizziness Handicap Inventory              Swathi Lovell  PT  11/7/2024

## 2024-11-21 ENCOUNTER — APPOINTMENT (OUTPATIENT)
Dept: PHYSICAL THERAPY | Facility: HOSPITAL | Age: 74
End: 2024-11-21
Payer: MEDICARE

## 2025-01-03 DIAGNOSIS — R73.01 ELEVATED FASTING GLUCOSE: ICD-10-CM

## 2025-01-03 DIAGNOSIS — I10 ESSENTIAL HYPERTENSION: Chronic | ICD-10-CM

## 2025-01-03 DIAGNOSIS — E78.2 MIXED HYPERLIPIDEMIA: Primary | ICD-10-CM

## 2025-01-07 ENCOUNTER — TELEPHONE (OUTPATIENT)
Dept: INTERNAL MEDICINE | Facility: CLINIC | Age: 75
End: 2025-01-07
Payer: MEDICARE

## 2025-01-07 NOTE — TELEPHONE ENCOUNTER
Hub staff attempted to follow warm transfer process and was unsuccessful     Caller: Amanda Hernandez    Relationship to patient: Self    Best call back number:     [phone]       Patient is needing: PATIENT IS CALLING TO RE SCHEDULE HER LAB APPOINTMENT ON 01/08/25.     UNABLE TO WARM TRANSFER.    PLEASE ADVISE.

## 2025-01-09 LAB
ALBUMIN SERPL-MCNC: 4.1 G/DL (ref 3.5–5.2)
ALBUMIN/GLOB SERPL: 1.5 G/DL
ALP SERPL-CCNC: 69 U/L (ref 39–117)
ALT SERPL-CCNC: 18 U/L (ref 1–33)
AST SERPL-CCNC: 27 U/L (ref 1–32)
BILIRUB SERPL-MCNC: 0.4 MG/DL (ref 0–1.2)
BUN SERPL-MCNC: 17 MG/DL (ref 8–23)
BUN/CREAT SERPL: 16.7 (ref 7–25)
CALCIUM SERPL-MCNC: 9.8 MG/DL (ref 8.6–10.5)
CHLORIDE SERPL-SCNC: 104 MMOL/L (ref 98–107)
CHOLEST SERPL-MCNC: 156 MG/DL (ref 0–200)
CO2 SERPL-SCNC: 28.5 MMOL/L (ref 22–29)
CREAT SERPL-MCNC: 1.02 MG/DL (ref 0.57–1)
EGFRCR SERPLBLD CKD-EPI 2021: 57.5 ML/MIN/1.73
GLOBULIN SER CALC-MCNC: 2.8 GM/DL
GLUCOSE SERPL-MCNC: 114 MG/DL (ref 65–99)
HBA1C MFR BLD: 5.7 % (ref 4.8–5.6)
HDLC SERPL-MCNC: 46 MG/DL (ref 40–60)
LDLC SERPL CALC-MCNC: 83 MG/DL (ref 0–100)
LDLC/HDLC SERPL: 1.72 {RATIO}
POTASSIUM SERPL-SCNC: 4 MMOL/L (ref 3.5–5.2)
PROT SERPL-MCNC: 6.9 G/DL (ref 6–8.5)
SODIUM SERPL-SCNC: 145 MMOL/L (ref 136–145)
TRIGL SERPL-MCNC: 155 MG/DL (ref 0–150)
VLDLC SERPL CALC-MCNC: 27 MG/DL (ref 5–40)

## 2025-01-16 ENCOUNTER — OFFICE VISIT (OUTPATIENT)
Dept: INTERNAL MEDICINE | Facility: CLINIC | Age: 75
End: 2025-01-16
Payer: MEDICARE

## 2025-01-16 VITALS
WEIGHT: 175 LBS | BODY MASS INDEX: 28.12 KG/M2 | HEART RATE: 78 BPM | RESPIRATION RATE: 16 BRPM | SYSTOLIC BLOOD PRESSURE: 122 MMHG | DIASTOLIC BLOOD PRESSURE: 70 MMHG | OXYGEN SATURATION: 98 % | HEIGHT: 66 IN

## 2025-01-16 DIAGNOSIS — M85.80 OSTEOPENIA WITH HIGH RISK OF FRACTURE: ICD-10-CM

## 2025-01-16 DIAGNOSIS — R73.03 PREDIABETES: ICD-10-CM

## 2025-01-16 DIAGNOSIS — F41.9 ANXIETY: ICD-10-CM

## 2025-01-16 DIAGNOSIS — E78.2 MIXED HYPERLIPIDEMIA: Chronic | ICD-10-CM

## 2025-01-16 DIAGNOSIS — E03.9 ACQUIRED HYPOTHYROIDISM: ICD-10-CM

## 2025-01-16 DIAGNOSIS — I10 ESSENTIAL HYPERTENSION: Primary | Chronic | ICD-10-CM

## 2025-01-16 PROCEDURE — 1159F MED LIST DOCD IN RCRD: CPT | Performed by: NURSE PRACTITIONER

## 2025-01-16 PROCEDURE — 99214 OFFICE O/P EST MOD 30 MIN: CPT | Performed by: NURSE PRACTITIONER

## 2025-01-16 PROCEDURE — 1160F RVW MEDS BY RX/DR IN RCRD: CPT | Performed by: NURSE PRACTITIONER

## 2025-01-16 PROCEDURE — 1126F AMNT PAIN NOTED NONE PRSNT: CPT | Performed by: NURSE PRACTITIONER

## 2025-01-16 PROCEDURE — 3078F DIAST BP <80 MM HG: CPT | Performed by: NURSE PRACTITIONER

## 2025-01-16 PROCEDURE — G2211 COMPLEX E/M VISIT ADD ON: HCPCS | Performed by: NURSE PRACTITIONER

## 2025-01-16 PROCEDURE — 3074F SYST BP LT 130 MM HG: CPT | Performed by: NURSE PRACTITIONER

## 2025-01-16 NOTE — PROGRESS NOTES
Subjective   Amanda Hernandez is a 74 y.o. female. Patient is here today for   Chief Complaint   Patient presents with    Hyperlipidemia    Hypertension   Answers submitted by the patient for this visit:  Primary Reason for Visit (Submitted on 1/9/2025)  What is the primary reason for your visit?: Problem Not Listed  Problem not listed (Submitted on 1/9/2025)  Chief Complaint: Other medical problem  Reason for appointment: Request by Susanne Hernandez  Onset: at an unknown time  Chronicity: new  Frequency: intermittently  Medications tried: None  Additional information: Susanne Hernandez requested that I have lab work and a revisit done at 6 months due to blood sugar level during last lab work.           Vitals:    01/16/25 1035   BP: 122/70   Pulse: 78   Resp: 16   SpO2: 98%     Body mass index is 28.25 kg/m².  The following portions of the patient's history were reviewed and updated as appropriate: allergies, current medications, past family history, past medical history, past social history, past surgical history and problem list.    History of Present Illness  Amanda is a 74-year-old female who is here for a 6 month follow up for prediabetes, HLD , and HTN.   She reports no significant changes in her urinary frequency, thirst, or hunger. She experienced weight loss during a recent vacation but has since regained 3 to 4 pounds. However, her current weight remains lower than her previous recorded weight.The patient reports a strong family history of diabetes on both sides of the family.     She has been experiencing general malaise and poor sleep quality, which she attributes to increased stress at work. She has a history of taking Lexapro 10 mg, initially halving the dose and subsequently taking it every other day. Following her vacation, she discontinued the medication entirely. She does not report any depressive symptoms but expresses dissatisfaction with her current living situation. She has been residing in this area  for over 30 years, away from her family, and often feels isolated. She finds solace in her comfortable condo and her ability to continue working, supplementing her social security income. She does not believe Lexapro significantly improved her mood. She reports difficulty falling asleep, often dozing off while watching television but struggling to quiet her mind once in bed. She has a history of obsessive-compulsive disorder (OCD) and describes herself as a perfectionist, which she believes contributes to her stress. She has been off Lexapro since 10/01/2024. She does not consider herself depressed but admits to feeling less happy than she would like. She has tried counseling in the past but did not find it beneficial. She expresses a desire to improve her self-esteem and reduce feelings of intimidation and jealousy. She finds communication with her family members stressful,           Past Medical History:   Diagnosis Date    Allergic 1960    Been most of my life    Arthritis     Arthritis of back     Unsure    Fracture of wrist     Both wrists - as a child    GERD (gastroesophageal reflux disease)     Hip arthrosis     Unsure    Hyperlipidemia  ?    Hypertension  ?    Hyperthyroidism 1964    Knee swelling     Unsure    Tear of meniscus of knee     Unsure of date approx 30 yrs ago    Visual impairment Wear glasses      Allergies   Allergen Reactions    Morphine And Codeine GI Intolerance, Nausea And Vomiting and Nausea Only    Sulfa Antibiotics Nausea And Vomiting      Social History     Socioeconomic History    Marital status: Single   Tobacco Use    Smoking status: Former     Current packs/day: 0.00     Average packs/day: 0.5 packs/day for 2.0 years (1.0 ttl pk-yrs)     Types: Cigarettes     Start date: 1971     Quit date: 1973     Years since quittin.0    Smokeless tobacco: Never   Vaping Use    Vaping status: Never Used   Substance and Sexual Activity    Alcohol use: Not Currently     Drug use: Never    Sexual activity: Not Currently        Current Outpatient Medications:     alendronate (Fosamax) 70 MG tablet, Take 1 tablet by mouth Every 7 (Seven) Days., Disp: 5 tablet, Rfl: 11    aspirin 81 MG EC tablet, Take 1 tablet by mouth Daily., Disp: , Rfl:     atorvastatin (LIPITOR) 40 MG tablet, Take 1 tablet by mouth Daily., Disp: 90 tablet, Rfl: 3    Calcium Carbonate-Vitamin D (CALTRATE 600+D PO), Take  by mouth., Disp: , Rfl:     esomeprazole (nexIUM) 20 MG capsule, Take 1 capsule by mouth Every Morning Before Breakfast., Disp: , Rfl:     fluticasone (FLONASE) 50 MCG/ACT nasal spray, 2 sprays by Each Nare route Daily., Disp: 48 g, Rfl: 3    hydroCHLOROthiazide 25 MG tablet, TAKE 1 TABLET BY MOUTH EVERY MORNING, Disp: 90 tablet, Rfl: 3    levothyroxine (SYNTHROID, LEVOTHROID) 88 MCG tablet, TAKE 1 TABLET BY MOUTH DAILY, Disp: 90 tablet, Rfl: 3    losartan (COZAAR) 100 MG tablet, TAKE 1 TABLET BY MOUTH DAILY, Disp: 90 tablet, Rfl: 3    Omega-3 Fatty Acids (OMEGA 3 PO), Take  by mouth., Disp: , Rfl:     vitamin D3 125 MCG (5000 UT) capsule capsule, Take 1 capsule by mouth Daily., Disp: , Rfl:    Review of Systems   Constitutional:  Negative for fatigue.   HENT: Negative.     Respiratory: Negative.     Cardiovascular: Negative.    Gastrointestinal: Negative.    Endocrine: Negative for polydipsia, polyphagia and polyuria.   Genitourinary: Negative.    Musculoskeletal:  Positive for arthralgias.   Psychiatric/Behavioral:  Positive for sleep disturbance. Negative for dysphoric mood. The patient is nervous/anxious.        Objective   Orders Only on 01/03/2025   Component Date Value Ref Range Status    Glucose 01/09/2025 114 (H)  65 - 99 mg/dL Final    BUN 01/09/2025 17  8 - 23 mg/dL Final    Creatinine 01/09/2025 1.02 (H)  0.57 - 1.00 mg/dL Final    EGFR Result 01/09/2025 57.5 (L)  >60.0 mL/min/1.73 Final    Comment: GFR Categories in Chronic Kidney Disease (CKD)/X09/  /X09/  GFR Category          GFR  (mL/min/1.73)    Interpretation  G1/X09/                    90 or greater/X09/        Normal or high  (1)  G2//                    60-89                Mild decrease  (1)  G3a                   45-59                Mild to moderate  decrease  G3b                   30-44                Moderate to  severe decrease  G4                    15-29                Severe decrease  G5                    14 or less           Kidney failure//  /[id number]/  (1)In the absence of evidence of kidney disease, neither  GFR category G1 or G2 fulfill the criteria for CKD.  eGFR calculation 2021 CKD-EPI creatinine equation, which  does not include race as a factor      BUN/Creatinine Ratio 01/09/2025 16.7  7.0 - 25.0 Final    Sodium 01/09/2025 145  136 - 145 mmol/L Final    Potassium 01/09/2025 4.0  3.5 - 5.2 mmol/L Final    Chloride 01/09/2025 104  98 - 107 mmol/L Final    Total CO2 01/09/2025 28.5  22.0 - 29.0 mmol/L Final    Calcium 01/09/2025 9.8  8.6 - 10.5 mg/dL Final    Total Protein 01/09/2025 6.9  6.0 - 8.5 g/dL Final    Albumin 01/09/2025 4.1  3.5 - 5.2 g/dL Final    Globulin 01/09/2025 2.8  gm/dL Final    A/G Ratio 01/09/2025 1.5  g/dL Final    Total Bilirubin 01/09/2025 0.4  0.0 - 1.2 mg/dL Final    Alkaline Phosphatase 01/09/2025 69  39 - 117 U/L Final    AST (SGOT) 01/09/2025 27  1 - 32 U/L Final    ALT (SGPT) 01/09/2025 18  1 - 33 U/L Final    Total Cholesterol 01/09/2025 156  0 - 200 mg/dL Final    Comment: Cholesterol Reference Ranges  (U.S. Department of Health and Human Services ATP III  Classifications)  Desirable          <200 mg/dL  Borderline High    200-239 mg/dL  High Risk          >240 mg/dL  Triglyceride Reference Ranges  (U.S. Department of Health and Human Services ATP III  Classifications)  Normal           <150 mg/dL  Borderline High  150-199 mg/dL  High             200-499 mg/dL  Very High        >500 mg/dL  HDL Reference Ranges  (U.S. Department of Health and Human Services ATP  III  Classifications)  Low     <40 mg/dl (major risk factor for CHD)  High    >60 mg/dl ('negative' risk factor for CHD)  LDL Reference Ranges  (U.S. Department of Health and Human Services ATP III  Classifications)  Optimal          <100 mg/dL  Near Optimal     100-129 mg/dL  Borderline High  130-159 mg/dL  High             160-189 mg/dL  Very High        >189 mg/dL      Triglycerides 01/09/2025 155 (H)  0 - 150 mg/dL Final    HDL Cholesterol 01/09/2025 46  40 - 60 mg/dL Final    VLDL Cholesterol Mino 01/09/2025 27  5 - 40 mg/dL Final    LDL Chol Calc (NIH) 01/09/2025 83  0 - 100 mg/dL Final    LDL/HDL RATIO 01/09/2025 1.72   Final    Hemoglobin A1C 01/09/2025 5.70 (H)  4.80 - 5.60 % Final    Comment: Hemoglobin A1C Ranges:  Increased Risk for Diabetes  5.7% to 6.4%  Diabetes                     >= 6.5%  Diabetic Goal                < 7.0%         Physical Exam  Vitals and nursing note reviewed.   Constitutional:       General: She is not in acute distress.  HENT:      Head: Normocephalic.   Cardiovascular:      Rate and Rhythm: Normal rate and regular rhythm.      Heart sounds: Normal heart sounds.   Pulmonary:      Effort: Pulmonary effort is normal.      Breath sounds: Normal breath sounds.   Skin:     General: Skin is warm and dry.   Neurological:      Mental Status: She is alert and oriented to person, place, and time.   Psychiatric:         Mood and Affect: Mood normal.         Assessment    Diagnoses and all orders for this visit:    1. Essential hypertension (Primary)    2. Prediabetes    3. Mixed hyperlipidemia    4. Anxiety    5. Acquired hypothyroidism    6. Osteopenia with high risk of fracture        Assessment & Plan  1. Prediabetes.  Her A1c level is currently at 5.7, Given her strong family history of diabetes. She is advised to limit her intake of sugar and starch. A1c levels will be checked every 6 months. If her condition progresses to diabetes, medication will be considered.  2. Difficulty  sleeping, anxiety ,   She reports difficulty sleeping and racing thoughts at night. She is advised to consider resuming Lexapro at a lower dose of 5 mg, to be taken before bedtime, to help manage her symptoms. I recommended counseling for coping mechanisms - Dr Sunshine Mcgovern  3. HTN- well controlled continue current medication  4. Hypothyroidism - continue current dose of levothyroxine   5. HLD- continue atorvastatin   5. Osteopenia with high risk of fracture- continue calcium with vitamin d and fosamax       Return in about 6 months (around 7/16/2025) for medicare wellness, with labs including a1c .    Patient or patient representative verbalized consent for the use of Ambient Listening during the visit with  JEFFERSON Mederos for chart documentation. 1/16/2025  10:48 EST

## 2025-02-25 ENCOUNTER — TRANSCRIBE ORDERS (OUTPATIENT)
Dept: ADMINISTRATIVE | Facility: HOSPITAL | Age: 75
End: 2025-02-25
Payer: MEDICARE

## 2025-02-25 DIAGNOSIS — Z12.31 SCREENING MAMMOGRAM, ENCOUNTER FOR: Primary | ICD-10-CM

## 2025-03-27 ENCOUNTER — OFFICE VISIT (OUTPATIENT)
Dept: INTERNAL MEDICINE | Facility: CLINIC | Age: 75
End: 2025-03-27
Payer: MEDICARE

## 2025-03-27 VITALS
RESPIRATION RATE: 16 BRPM | HEART RATE: 67 BPM | OXYGEN SATURATION: 97 % | BODY MASS INDEX: 27.15 KG/M2 | SYSTOLIC BLOOD PRESSURE: 110 MMHG | WEIGHT: 173 LBS | HEIGHT: 67 IN | DIASTOLIC BLOOD PRESSURE: 60 MMHG
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
## 2025-03-27 DIAGNOSIS — R07.81 RIB PAIN ON LEFT SIDE: ICD-10-CM

## 2025-03-27 DIAGNOSIS — N63.20 MASS OF LEFT BREAST, UNSPECIFIED QUADRANT: ICD-10-CM

## 2025-03-27 DIAGNOSIS — M25.532 LEFT WRIST PAIN: ICD-10-CM

## 2025-03-27 NOTE — PROGRESS NOTES
Subjective   Amanda Hernandez is a 74 y.o. female. Patient is here today for   Chief Complaint   Patient presents with    Fall    Breast Mass            Vitals:    03/27/25 1324   BP: 110/60   Pulse: 67   Resp: 16   SpO2: 97%     Body mass index is 27.1 kg/m².  The following portions of the patient's history were reviewed and updated as appropriate: allergies, current medications, past family history, past medical history, past social history, past surgical history and problem list.    History of Present Illness  The patient presents for evaluation of left wrist and rib pain after a fall and a lump in her breast.    She experienced a fall three weeks ago while walking her dog, during which the dog's leash entangled her feet, causing her to fall sideways onto the curb and sidewalk. The impact resulted in injuries to her wrist, chest, side, and breast. She has been experiencing soreness in these areas for several weeks. A few days ago, she noticed a distinct lump in the bruised area of her breast. She does not believe she sustained any fractures from the fall, although she acknowledges that the affected wrist appears slightly larger than the other. She reports no severe pain or numbness in her fingers. Her pain level is rated at 7 out of 10, with increased discomfort in her left ribs during coughing episodes. She also reports a sensation of pulling in the area, which prompted her to wear a bra to alleviate the downward pull on the tissue. She has been managing her pain with Tylenol and occasionally Advil. She has a history of bilateral wrist fractures from childhood.  She has been taking tylenol and advil as needed     Past Medical History:   Diagnosis Date    Allergic 1960    Been most of my life    Arthritis 1990    Arthritis of back     Unsure    Fracture of wrist     Both wrists - as a child    GERD (gastroesophageal reflux disease) 1980    Hip arthrosis     Unsure    Hyperlipidemia 2014 ?    Hypertension 2014 ?     Hyperthyroidism 1964    Knee swelling     Unsure    Tear of meniscus of knee     Unsure of date approx 30 yrs ago    Visual impairment Wear glasses      Allergies   Allergen Reactions    Morphine And Codeine GI Intolerance, Nausea And Vomiting and Nausea Only    Sulfa Antibiotics Nausea And Vomiting      Social History     Socioeconomic History    Marital status: Single   Tobacco Use    Smoking status: Former     Current packs/day: 0.00     Average packs/day: 0.5 packs/day for 2.0 years (1.0 ttl pk-yrs)     Types: Cigarettes     Start date: 1971     Quit date: 1973     Years since quittin.2    Smokeless tobacco: Never   Vaping Use    Vaping status: Never Used   Substance and Sexual Activity    Alcohol use: Not Currently    Drug use: Never    Sexual activity: Not Currently        Current Outpatient Medications:     alendronate (Fosamax) 70 MG tablet, Take 1 tablet by mouth Every 7 (Seven) Days., Disp: 5 tablet, Rfl: 11    aspirin 81 MG EC tablet, Take 1 tablet by mouth Daily., Disp: , Rfl:     atorvastatin (LIPITOR) 40 MG tablet, Take 1 tablet by mouth Daily., Disp: 90 tablet, Rfl: 3    Calcium Carbonate-Vitamin D (CALTRATE 600+D PO), Take  by mouth., Disp: , Rfl:     escitalopram (LEXAPRO) 5 MG tablet, Take 1 tablet by mouth Daily., Disp: , Rfl:     esomeprazole (nexIUM) 20 MG capsule, Take 1 capsule by mouth Every Morning Before Breakfast., Disp: , Rfl:     fluticasone (FLONASE) 50 MCG/ACT nasal spray, 2 sprays by Each Nare route Daily., Disp: 48 g, Rfl: 3    hydroCHLOROthiazide 25 MG tablet, TAKE 1 TABLET BY MOUTH EVERY MORNING, Disp: 90 tablet, Rfl: 3    levothyroxine (SYNTHROID, LEVOTHROID) 88 MCG tablet, TAKE 1 TABLET BY MOUTH DAILY, Disp: 90 tablet, Rfl: 3    losartan (COZAAR) 100 MG tablet, TAKE 1 TABLET BY MOUTH DAILY, Disp: 90 tablet, Rfl: 3    Omega-3 Fatty Acids (OMEGA 3 PO), Take  by mouth., Disp: , Rfl:     vitamin D3 125 MCG (5000 UT) capsule capsule, Take 1 capsule by mouth Daily., Disp:  , Rfl:    Review of Systems    Objective   Physical Exam  Vitals and nursing note reviewed.   Constitutional:       General: She is not in acute distress.  HENT:      Head: Normocephalic.   Cardiovascular:      Rate and Rhythm: Normal rate.   Pulmonary:      Effort: Pulmonary effort is normal.   Chest:   Breasts:     Left: Mass present. No swelling, nipple discharge or tenderness.       Musculoskeletal:      Left wrist: Swelling and tenderness present. No deformity (bruisng to wrist noted). Normal pulse.   Neurological:      Mental Status: She is alert.         Assessment    Diagnoses and all orders for this visit:    1. Fall, initial encounter (Primary)  -     XR Wrist 3+ View Left (In Office)  -     XR Ribs Left With PA Chest    2. Mass of left breast, unspecified quadrant  -     Mammo Diagnostic Digital Tomosynthesis Left With CAD; Future  -     US Axilla Left; Future    3. Rib pain on left side  -     XR Ribs Left With PA Chest    4. Left wrist pain  -     XR Wrist 3+ View Left (In Office)        Assessment & Plan  1. Breast lump.  The etiology of the lump remains uncertain, with differential diagnoses including a hematoma or cyst. A diagnostic mammogram and ultrasound have been ordered to further investigate the nature of the lump. She has been provided with the contact information for [facility] to arrange these tests.    2. Rib pain.  The rib pain is likely due to bruising from the fall. An x-ray of the ribs will be conducted to rule out any fractures.    3. Left wrist pain- will get an xray of the left wrist for evaluation after a fall   She can continue tylenol as needed  Follow up as needed and for continual care     Return if symptoms worsen or fail to improve.    Patient or patient representative verbalized consent for the use of Ambient Listening during the visit with  JEFFERSON Mederos for chart documentation. 3/27/2025  15:33 EDT

## 2025-04-17 ENCOUNTER — OFFICE VISIT (OUTPATIENT)
Dept: INTERNAL MEDICINE | Facility: CLINIC | Age: 75
End: 2025-04-17
Payer: MEDICARE

## 2025-04-17 VITALS
BODY MASS INDEX: 27.78 KG/M2 | HEIGHT: 67 IN | WEIGHT: 177 LBS | HEART RATE: 88 BPM | SYSTOLIC BLOOD PRESSURE: 126 MMHG | RESPIRATION RATE: 18 BRPM | DIASTOLIC BLOOD PRESSURE: 94 MMHG | OXYGEN SATURATION: 98 %

## 2025-04-17 DIAGNOSIS — I10 PRIMARY HYPERTENSION: Primary | ICD-10-CM

## 2025-04-17 LAB
BUN SERPL-MCNC: 25 MG/DL (ref 8–23)
BUN/CREAT SERPL: 28.4 (ref 7–25)
CALCIUM SERPL-MCNC: 10.1 MG/DL (ref 8.6–10.5)
CHLORIDE SERPL-SCNC: 106 MMOL/L (ref 98–107)
CO2 SERPL-SCNC: 27.5 MMOL/L (ref 22–29)
CREAT SERPL-MCNC: 0.88 MG/DL (ref 0.57–1)
EGFRCR SERPLBLD CKD-EPI 2021: 68.6 ML/MIN/1.73
GLUCOSE SERPL-MCNC: 102 MG/DL (ref 65–99)
POTASSIUM SERPL-SCNC: 4.1 MMOL/L (ref 3.5–5.2)
SODIUM SERPL-SCNC: 144 MMOL/L (ref 136–145)

## 2025-04-17 PROCEDURE — 99213 OFFICE O/P EST LOW 20 MIN: CPT | Performed by: NURSE PRACTITIONER

## 2025-04-17 PROCEDURE — 1159F MED LIST DOCD IN RCRD: CPT | Performed by: NURSE PRACTITIONER

## 2025-04-17 PROCEDURE — 1125F AMNT PAIN NOTED PAIN PRSNT: CPT | Performed by: NURSE PRACTITIONER

## 2025-04-17 PROCEDURE — 3080F DIAST BP >= 90 MM HG: CPT | Performed by: NURSE PRACTITIONER

## 2025-04-17 PROCEDURE — 1160F RVW MEDS BY RX/DR IN RCRD: CPT | Performed by: NURSE PRACTITIONER

## 2025-04-17 PROCEDURE — 3074F SYST BP LT 130 MM HG: CPT | Performed by: NURSE PRACTITIONER

## 2025-04-17 NOTE — PROGRESS NOTES
Subjective   Amanda Hernandez is a 74 y.o. female. Patient is here today for   Chief Complaint   Patient presents with    Blood Pressure Check     154/ 104 highest     Headache   Answers submitted by the patient for this visit:  High Blood Pressure Questionnaire (Submitted on 4/17/2025)  Chief Complaint: Hypertension  Chronicity: recurrent  Onset: more than 1 year ago  Progression since onset: stable  Condition status: controlled  anxiety: No  blurred vision: No  malaise/fatigue: Yes  orthopnea: No  peripheral edema: No  Agents associated with hypertension: thyroid hormones  CAD risks: dyslipidemia  Compliance problems: no compliance problems  Additional Information: Had a bad headache throughout the night, somewhat nauseated and bowel cramping, ears feel full, somewhat light headed.           Vitals:    04/17/25 1058   BP: 126/94   Pulse: 88   Resp: 18   SpO2: 98%     Body mass index is 27.72 kg/m².  The following portions of the patient's history were reviewed and updated as appropriate: allergies, current medications, past family history, past medical history, past social history, past surgical history and problem list.    History of Present Illness  The patient presents for evaluation of elevated blood pressure, headache that started this am   A headache was experienced, which was not alleviated by a cold compress. Initially localized to the front, the headache extended to the back of the head, prompting a blood pressure check, which read 154/104. Despite taking medication, the headache persists, accompanied by lightheadedness and a general feeling of malaise. No chest pain or shortness of breath is reported. The heart rate was approximately 100, with subsequent readings of 104 and 103. An hour later, the heart rate decreased to the high 90s, and just prior to the visit, it was 94. She reports feeling better than at 8:30 AM when she called but still does not feel right. Typically, losartan is taken between 5:00 and  6:00 PM, but it was forgotten last night and taken around 9:00 PM. Hydrochlorothiazide is taken in the morning, and the morning dose was taken today. A few pounds of weight gain are noted, with a suspicion of water retention.  No recent issues with blood pressure have been noted. A meal prepared for her son included low sodium soy sauce, which was different from her usual fare. A visit to the dentist yesterday recorded blood pressure at 84/28. No caffeine consumption is reported. An [employer] blood pressure monitor is used at home for monitoring.    Increased bowel movements are reported, with 5 to 6 episodes this morning, 3 or 4 of which were regular in consistency. This is unusual. A history of colitis when younger is noted, but no current stomach cramping is experienced. No blood in the stool is reported. The first 2 or 3 bowel movements were regular and occurred within half an hour of each other, which is not typical. As she has aged, periods of skipping a day without a bowel movement have been experienced, but never several days without one.    No current rib pain is reported, and the bruise has resolved. A mammogram and ultrasound are scheduled for next Wednesday.        Past Medical History:   Diagnosis Date    Allergic 1960    Been most of my life    Arthritis 1990    Arthritis of back     Unsure    Fracture of wrist     Both wrists - as a child    GERD (gastroesophageal reflux disease) 1980    Hip arthrosis     Unsure    Hyperlipidemia 2014 ?    Hypertension 2014 ?    Hyperthyroidism 1964    Knee swelling     Unsure    Tear of meniscus of knee     Unsure of date approx 30 yrs ago    Visual impairment Wear glasses      Allergies   Allergen Reactions    Morphine And Codeine GI Intolerance, Nausea And Vomiting and Nausea Only    Sulfa Antibiotics Nausea And Vomiting      Social History     Socioeconomic History    Marital status: Single   Tobacco Use    Smoking status: Former     Current packs/day: 0.00      Average packs/day: 0.5 packs/day for 2.0 years (1.0 ttl pk-yrs)     Types: Cigarettes     Start date: 1971     Quit date: 1973     Years since quittin.3    Smokeless tobacco: Never   Vaping Use    Vaping status: Never Used   Substance and Sexual Activity    Alcohol use: Not Currently    Drug use: Never    Sexual activity: Not Currently        Current Outpatient Medications:     alendronate (Fosamax) 70 MG tablet, Take 1 tablet by mouth Every 7 (Seven) Days., Disp: 5 tablet, Rfl: 11    aspirin 81 MG EC tablet, Take 1 tablet by mouth Daily., Disp: , Rfl:     atorvastatin (LIPITOR) 40 MG tablet, Take 1 tablet by mouth Daily., Disp: 90 tablet, Rfl: 3    Calcium Carbonate-Vitamin D (CALTRATE 600+D PO), Take  by mouth., Disp: , Rfl:     escitalopram (LEXAPRO) 5 MG tablet, Take 1 tablet by mouth Daily., Disp: , Rfl:     esomeprazole (nexIUM) 20 MG capsule, Take 1 capsule by mouth Every Morning Before Breakfast., Disp: , Rfl:     fluticasone (FLONASE) 50 MCG/ACT nasal spray, 2 sprays by Each Nare route Daily., Disp: 48 g, Rfl: 3    hydroCHLOROthiazide 25 MG tablet, TAKE 1 TABLET BY MOUTH EVERY MORNING, Disp: 90 tablet, Rfl: 3    levothyroxine (SYNTHROID, LEVOTHROID) 88 MCG tablet, TAKE 1 TABLET BY MOUTH DAILY, Disp: 90 tablet, Rfl: 3    losartan (COZAAR) 100 MG tablet, TAKE 1 TABLET BY MOUTH DAILY, Disp: 90 tablet, Rfl: 3    Omega-3 Fatty Acids (OMEGA 3 PO), Take  by mouth., Disp: , Rfl:     vitamin D3 125 MCG (5000 UT) capsule capsule, Take 1 capsule by mouth Daily., Disp: , Rfl:    Review of Systems   Constitutional:  Positive for fatigue. Negative for fever.   Eyes:  Negative for visual disturbance.   Respiratory:  Negative for shortness of breath.    Cardiovascular:  Negative for chest pain and palpitations.   Gastrointestinal:  Negative for constipation, diarrhea and nausea.        Had episode of frequent normal BM this am    Genitourinary: Negative.    Neurological:  Positive for light-headedness and  headaches. Negative for tremors, speech difficulty, weakness and numbness.       Objective   Physical Exam  Vitals and nursing note reviewed.   Constitutional:       General: She is not in acute distress.  Cardiovascular:      Rate and Rhythm: Normal rate and regular rhythm.      Heart sounds: Normal heart sounds.      Comments: BP recheck 120/82   Pulmonary:      Effort: Pulmonary effort is normal.      Breath sounds: Normal breath sounds.   Musculoskeletal:      Right lower leg: No edema.      Left lower leg: No edema.   Skin:     General: Skin is warm and dry.   Neurological:      General: No focal deficit present.      Mental Status: She is alert and oriented to person, place, and time.         Assessment    Diagnoses and all orders for this visit:    1. Primary hypertension (Primary)  -     Basic metabolic panel        Assessment & Plan  1. Elevated blood pressure.  - Blood pressure readings have been progressively decreasing from 154/104 to 120/82.  - Physical examination shows no leg swelling and heart sounds are regular.  - Comprehensive metabolic panel will be conducted today to assess renal function and electrolyte balance.  - Advised to rest at home with feet elevated; if blood pressure remains elevated, will add amlodipine     2. Headache.  - Headache initially started in the front and moved to the back of the head; currently improved but still feeling lightheaded and generally unwell.  - No chest pain or shortness of breath reported; oxygen level is good.  - Advised to rest and monitor symptoms; if symptoms persist or worsen, further evaluation may be necessary.  -advised to go to the Er for severe headache  Tylenol as needed     3. Increased bowel movements.  - Reports having five to six bowel movements this morning, which is unusual for her; bowel movements were almost like regular bowel movements.  - No blood in the stool; last bowel movement was of lesser amount.  - Advised to monitor symptoms and  report if experiencing persistent diarrhea or any other changes.      Follow-up  - Follow-up appointment scheduled in a couple of weeks to recheck blood pressure and ensure overall health.            Patient or patient representative verbalized consent for the use of Ambient Listening during the visit with  JEFFERSON Mederos for chart documentation. 4/17/2025  15:22 EDT

## 2025-04-23 ENCOUNTER — HOSPITAL ENCOUNTER (OUTPATIENT)
Dept: MAMMOGRAPHY | Facility: HOSPITAL | Age: 75
Discharge: HOME | End: 2025-04-23
Payer: MEDICARE

## 2025-04-23 ENCOUNTER — HOSPITAL ENCOUNTER (OUTPATIENT)
Dept: ULTRASOUND IMAGING | Facility: HOSPITAL | Age: 75
Discharge: HOME | End: 2025-04-23
Payer: MEDICARE

## 2025-04-23 DIAGNOSIS — N63.20 MASS OF LEFT BREAST, UNSPECIFIED QUADRANT: ICD-10-CM

## 2025-04-23 PROCEDURE — G0279 TOMOSYNTHESIS, MAMMO: HCPCS

## 2025-04-23 PROCEDURE — 77066 DX MAMMO INCL CAD BI: CPT

## 2025-04-23 PROCEDURE — 76642 ULTRASOUND BREAST LIMITED: CPT

## 2025-04-25 ENCOUNTER — TELEPHONE (OUTPATIENT)
Dept: INTERNAL MEDICINE | Facility: CLINIC | Age: 75
End: 2025-04-25
Payer: MEDICARE

## 2025-04-25 DIAGNOSIS — N63.20 MASS OF LEFT BREAST, UNSPECIFIED QUADRANT: Primary | ICD-10-CM

## 2025-04-25 DIAGNOSIS — R92.8 ABNORMAL MAMMOGRAM: ICD-10-CM

## 2025-04-25 NOTE — TELEPHONE ENCOUNTER
Caller: Amanda Hernandez    Relationship to patient: Self    Best call back number:     [phone]       Patient is needing: PATIENT REPORTS NEEDING TO SET UP A MAMMO AND ULTRASOUND SIX MONTHS FROM NOW. PLEASE PLACE/ADJUST ORDERS FOR THIS AND CALL TO SCHEDULE.

## 2025-05-05 RX ORDER — ATORVASTATIN CALCIUM 40 MG/1
40 TABLET, FILM COATED ORAL DAILY
Qty: 90 TABLET | Refills: 3 | Status: SHIPPED | OUTPATIENT
Start: 2025-05-05

## 2025-06-27 RX ORDER — HYDROCHLOROTHIAZIDE 25 MG/1
25 TABLET ORAL EVERY MORNING
Qty: 90 TABLET | Refills: 3 | Status: SHIPPED | OUTPATIENT
Start: 2025-06-27

## 2025-06-27 RX ORDER — LOSARTAN POTASSIUM 100 MG/1
100 TABLET ORAL DAILY
Qty: 90 TABLET | Refills: 3 | Status: SHIPPED | OUTPATIENT
Start: 2025-06-27

## 2025-06-27 RX ORDER — LEVOTHYROXINE SODIUM 88 UG/1
88 TABLET ORAL DAILY
Qty: 90 TABLET | Refills: 3 | Status: SHIPPED | OUTPATIENT
Start: 2025-06-27

## 2025-07-08 DIAGNOSIS — E78.2 MIXED HYPERLIPIDEMIA: ICD-10-CM

## 2025-07-08 DIAGNOSIS — I10 PRIMARY HYPERTENSION: Primary | ICD-10-CM

## 2025-07-08 DIAGNOSIS — E03.9 ACQUIRED HYPOTHYROIDISM: ICD-10-CM

## 2025-07-17 LAB
ALBUMIN SERPL-MCNC: 4.5 G/DL (ref 3.5–5.2)
ALBUMIN/GLOB SERPL: 1.6 G/DL
ALP SERPL-CCNC: 57 U/L (ref 39–117)
ALT SERPL-CCNC: 20 U/L (ref 1–33)
AST SERPL-CCNC: 31 U/L (ref 1–32)
BASOPHILS # BLD AUTO: 0.06 10*3/MM3 (ref 0–0.2)
BASOPHILS NFR BLD AUTO: 1.2 % (ref 0–1.5)
BILIRUB SERPL-MCNC: 0.5 MG/DL (ref 0–1.2)
BUN SERPL-MCNC: 21 MG/DL (ref 8–23)
BUN/CREAT SERPL: 19.1 (ref 7–25)
CALCIUM SERPL-MCNC: 9.7 MG/DL (ref 8.6–10.5)
CHLORIDE SERPL-SCNC: 105 MMOL/L (ref 98–107)
CHOLEST SERPL-MCNC: 154 MG/DL (ref 0–200)
CO2 SERPL-SCNC: 28 MMOL/L (ref 22–29)
CREAT SERPL-MCNC: 1.1 MG/DL (ref 0.57–1)
EGFRCR SERPLBLD CKD-EPI 2021: 52.5 ML/MIN/1.73
EOSINOPHIL # BLD AUTO: 0.29 10*3/MM3 (ref 0–0.4)
EOSINOPHIL NFR BLD AUTO: 5.7 % (ref 0.3–6.2)
ERYTHROCYTE [DISTWIDTH] IN BLOOD BY AUTOMATED COUNT: 12.2 % (ref 12.3–15.4)
GLOBULIN SER CALC-MCNC: 2.8 GM/DL
GLUCOSE SERPL-MCNC: 100 MG/DL (ref 65–99)
HCT VFR BLD AUTO: 43.6 % (ref 34–46.6)
HDLC SERPL-MCNC: 43 MG/DL (ref 40–60)
HGB BLD-MCNC: 14.5 G/DL (ref 12–15.9)
IMM GRANULOCYTES # BLD AUTO: 0.01 10*3/MM3 (ref 0–0.05)
IMM GRANULOCYTES NFR BLD AUTO: 0.2 % (ref 0–0.5)
LDLC SERPL CALC-MCNC: 74 MG/DL (ref 0–100)
LDLC/HDLC SERPL: 1.54 {RATIO}
LYMPHOCYTES # BLD AUTO: 1.93 10*3/MM3 (ref 0.7–3.1)
LYMPHOCYTES NFR BLD AUTO: 37.8 % (ref 19.6–45.3)
MCH RBC QN AUTO: 30.6 PG (ref 26.6–33)
MCHC RBC AUTO-ENTMCNC: 33.3 G/DL (ref 31.5–35.7)
MCV RBC AUTO: 92 FL (ref 79–97)
MONOCYTES # BLD AUTO: 0.43 10*3/MM3 (ref 0.1–0.9)
MONOCYTES NFR BLD AUTO: 8.4 % (ref 5–12)
NEUTROPHILS # BLD AUTO: 2.39 10*3/MM3 (ref 1.7–7)
NEUTROPHILS NFR BLD AUTO: 46.7 % (ref 42.7–76)
NRBC BLD AUTO-RTO: 0 /100 WBC (ref 0–0.2)
PLATELET # BLD AUTO: 224 10*3/MM3 (ref 140–450)
POTASSIUM SERPL-SCNC: 4.3 MMOL/L (ref 3.5–5.2)
PROT SERPL-MCNC: 7.3 G/DL (ref 6–8.5)
RBC # BLD AUTO: 4.74 10*6/MM3 (ref 3.77–5.28)
SODIUM SERPL-SCNC: 142 MMOL/L (ref 136–145)
TRIGL SERPL-MCNC: 224 MG/DL (ref 0–150)
TSH SERPL DL<=0.005 MIU/L-ACNC: 1.01 UIU/ML (ref 0.27–4.2)
VLDLC SERPL CALC-MCNC: 37 MG/DL (ref 5–40)
WBC # BLD AUTO: 5.11 10*3/MM3 (ref 3.4–10.8)

## 2025-07-24 ENCOUNTER — OFFICE VISIT (OUTPATIENT)
Dept: INTERNAL MEDICINE | Facility: CLINIC | Age: 75
End: 2025-07-24
Payer: MEDICARE

## 2025-07-24 VITALS
BODY MASS INDEX: 27.78 KG/M2 | WEIGHT: 177 LBS | DIASTOLIC BLOOD PRESSURE: 50 MMHG | HEART RATE: 63 BPM | SYSTOLIC BLOOD PRESSURE: 110 MMHG | OXYGEN SATURATION: 97 % | RESPIRATION RATE: 16 BRPM | HEIGHT: 67 IN

## 2025-07-24 DIAGNOSIS — E78.2 MIXED HYPERLIPIDEMIA: Chronic | ICD-10-CM

## 2025-07-24 DIAGNOSIS — N18.31 STAGE 3A CHRONIC KIDNEY DISEASE: ICD-10-CM

## 2025-07-24 DIAGNOSIS — E03.9 ACQUIRED HYPOTHYROIDISM: ICD-10-CM

## 2025-07-24 DIAGNOSIS — Z00.00 MEDICARE ANNUAL WELLNESS VISIT, SUBSEQUENT: Primary | ICD-10-CM

## 2025-07-24 DIAGNOSIS — F41.9 ANXIETY: ICD-10-CM

## 2025-07-24 DIAGNOSIS — I10 ESSENTIAL HYPERTENSION: Chronic | ICD-10-CM

## 2025-07-24 DIAGNOSIS — M85.80 OSTEOPENIA WITH HIGH RISK OF FRACTURE: ICD-10-CM

## 2025-07-24 DIAGNOSIS — R73.03 PREDIABETES: ICD-10-CM

## 2025-07-24 PROCEDURE — G0439 PPPS, SUBSEQ VISIT: HCPCS | Performed by: NURSE PRACTITIONER

## 2025-07-24 PROCEDURE — 1159F MED LIST DOCD IN RCRD: CPT | Performed by: NURSE PRACTITIONER

## 2025-07-24 PROCEDURE — 3078F DIAST BP <80 MM HG: CPT | Performed by: NURSE PRACTITIONER

## 2025-07-24 PROCEDURE — 1125F AMNT PAIN NOTED PAIN PRSNT: CPT | Performed by: NURSE PRACTITIONER

## 2025-07-24 PROCEDURE — 1160F RVW MEDS BY RX/DR IN RCRD: CPT | Performed by: NURSE PRACTITIONER

## 2025-07-24 PROCEDURE — 3074F SYST BP LT 130 MM HG: CPT | Performed by: NURSE PRACTITIONER

## 2025-07-24 PROCEDURE — 99214 OFFICE O/P EST MOD 30 MIN: CPT | Performed by: NURSE PRACTITIONER

## 2025-07-24 RX ORDER — ESCITALOPRAM OXALATE 5 MG/1
5 TABLET ORAL DAILY
Qty: 90 TABLET | Refills: 3 | Status: SHIPPED | OUTPATIENT
Start: 2025-07-24

## 2025-07-24 NOTE — PROGRESS NOTES
Subjective   The ABCs of the Annual Wellness Visit  Medicare Wellness Visit      Amanda Hernandez is a 74 y.o. patient who presents for a Medicare Wellness Visit.    The following portions of the patient's history were reviewed and   updated as appropriate: allergies, current medications, past family history, past medical history, past social history, past surgical history, and problem list.    Compared to one year ago, the patient's physical   health is the same.  Compared to one year ago, the patient's mental   health is the same.    Recent Hospitalizations:  She was not admitted to the hospital during the last year.     Current Medical Providers:  Patient Care Team:  Susanne Hernandez APRN as PCP - General (Family Medicine)  Camilo Solano APRN as Nurse Practitioner (Orthopedic Surgery)  Misti Mcgovern, PhD (Psychiatry)    Outpatient Medications Prior to Visit   Medication Sig Dispense Refill    alendronate (Fosamax) 70 MG tablet Take 1 tablet by mouth Every 7 (Seven) Days. 5 tablet 11    aspirin 81 MG EC tablet Take 1 tablet by mouth Daily.      atorvastatin (LIPITOR) 40 MG tablet TAKE 1 TABLET BY MOUTH DAILY 90 tablet 3    Calcium Carbonate-Vitamin D (CALTRATE 600+D PO) Take  by mouth.      esomeprazole (nexIUM) 20 MG capsule Take 1 capsule by mouth Every Morning Before Breakfast.      fluticasone (FLONASE) 50 MCG/ACT nasal spray 2 sprays by Each Nare route Daily. 48 g 3    hydroCHLOROthiazide 25 MG tablet TAKE 1 TABLET BY MOUTH EVERY MORNING 90 tablet 3    levothyroxine (SYNTHROID, LEVOTHROID) 88 MCG tablet TAKE 1 TABLET BY MOUTH DAILY 90 tablet 3    losartan (COZAAR) 100 MG tablet TAKE 1 TABLET BY MOUTH DAILY 90 tablet 3    Omega-3 Fatty Acids (OMEGA 3 PO) Take  by mouth.      vitamin D3 125 MCG (5000 UT) capsule capsule Take 1 capsule by mouth Daily.      escitalopram (LEXAPRO) 5 MG tablet Take 1 tablet by mouth Daily.       No facility-administered medications prior to visit.     No opioid medication  "identified on active medication list. I have reviewed chart for other potential  high risk medication/s and harmful drug interactions in the elderly.      Aspirin is on active medication list. Aspirin use is indicated based on review of current medical condition/s. Pros and cons of this therapy have been discussed today. Benefits of this medication outweigh potential harm.  Patient has been encouraged to continue taking this medication.  .      Patient Active Problem List   Diagnosis    Allergic rhinitis    Chronic depressive person    Esophageal reflux    Essential hypertension    Hypothyroidism    Mixed hyperlipidemia    Pyogenic granuloma    Vertigo    Osteopenia with high risk of fracture    Vitamin D deficiency    Prediabetes    Anxiety    Stage 3a chronic kidney disease     Advance Care Planning Advance Directive is not on file.  ACP discussion was held with the patient during this visit. Patient has an advance directive (not in EMR), copy requested.            Objective   Vitals:    07/24/25 1004   BP: 110/50   Pulse: 63   Resp: 16   SpO2: 97%   Weight: 80.3 kg (177 lb)   Height: 170.2 cm (67\")   PainSc: 7    PainLoc: Knee       Estimated body mass index is 27.72 kg/m² as calculated from the following:    Height as of this encounter: 170.2 cm (67\").    Weight as of this encounter: 80.3 kg (177 lb).    BMI is >= 25 and <30. (Overweight) The following options were offered after discussion;: weight loss educational material (shared in after visit summary)           Does the patient have evidence of cognitive impairment? No  Lab Results   Component Value Date    CHLPL 154 07/17/2025    TRIG 224 (H) 07/17/2025    HDL 43 07/17/2025    LDL 74 07/17/2025    VLDL 37 07/17/2025                                                                                                Health  Risk Assessment    Smoking Status:  Social History     Tobacco Use   Smoking Status Former    Current packs/day: 0.00    Average packs/day: " 0.5 packs/day for 2.0 years (1.0 ttl pk-yrs)    Types: Cigarettes    Start date: 1971    Quit date: 1973    Years since quittin.5   Smokeless Tobacco Never     Alcohol Consumption:  Social History     Substance and Sexual Activity   Alcohol Use Not Currently       Fall Risk Screen  STEVENADI Fall Risk Assessment was completed, and patient is at MODERATE risk for falls. Assessment completed on:2025    Depression Screening   Little interest or pleasure in doing things? Not at all   Feeling down, depressed, or hopeless? Not at all   PHQ-2 Total Score 0      Health Habits and Functional and Cognitive Screenin/17/2025    12:29 PM   Functional & Cognitive Status   Do you have difficulty preparing food and eating? No   Do you have difficulty bathing yourself, getting dressed or grooming yourself? No   Do you have difficulty using the toilet? No   Do you have difficulty moving around from place to place? No   Do you have trouble with steps or getting out of a bed or a chair? No   Current Diet Other   Dental Exam Up to date   Eye Exam Up to date   Exercise (times per week) Other   Current Exercises Include Walking   Do you need help using the phone?  No   Are you deaf or do you have serious difficulty hearing?  No   Do you need help to go to places out of walking distance? No   Do you need help shopping? No   Do you need help preparing meals?  No   Do you need help with housework?  No   Do you need help with laundry? No   Do you need help taking your medications? No   Do you need help managing money? No   Do you ever drive or ride in a car without wearing a seat belt? No   Have you felt unusual fatigue (could be tiredness), stress, anger or loneliness in the last month? Yes   Who do you live with? Alone   If you need help, do you have trouble finding someone available to you? No   Have you been bothered in the last four weeks by sexual problems? No   Do you have difficulty concentrating, remembering  or making decisions? No           Age-appropriate Screening Schedule:  Refer to the list below for future screening recommendations based on patient's age, sex and/or medical conditions. Orders for these recommended tests are listed in the plan section. The patient has been provided with a written plan.    Health Maintenance List  Health Maintenance   Topic Date Due    COLORECTAL CANCER SCREENING  10/01/2025    COVID-19 Vaccine (6 - 2024-25 season) 08/07/2025 (Originally 4/2/2025)    INFLUENZA VACCINE  10/01/2025    LIPID PANEL  07/17/2026    ANNUAL WELLNESS VISIT  07/24/2026    DXA SCAN  07/24/2026    TDAP/TD VACCINES (3 - Td or Tdap) 10/04/2034    HEPATITIS C SCREENING  Completed    RSV Vaccine - Adults  Completed    Pneumococcal Vaccine 50+  Completed    ZOSTER VACCINE  Completed    MAMMOGRAM  Discontinued                                                                                                                                                CMS Preventative Services Quick Reference  Risk Factors Identified During Encounter  None Identified    The above risks/problems have been discussed with the patient.  Pertinent information has been shared with the patient in the After Visit Summary.  An After Visit Summary and PPPS were made available to the patient.    Follow Up:   Next Medicare Wellness visit to be scheduled in 1 year.         Additional E&M Note during same encounter follows:  Patient has additional, significant, and separately identifiable condition(s)/problem(s) that require work above and beyond the Medicare Wellness Visit     Chief Complaint  Medicare Wellness-subsequent    Subjective   HPI  Amanda is here for a 6 month follow up for HTN, HLD, Prediabetes, hypothyroidism. She is doing well. She is following with a therapist for anxiety and continues on lexapro 5mg.       Review of Systems   Respiratory:  Positive for shortness of breath (due to allergies and humidity).    Gastrointestinal:   "Negative for abdominal pain.   Genitourinary:  Positive for dysuria (occasional). Negative for pelvic pain, vaginal bleeding and vaginal discharge.   Musculoskeletal:  Positive for arthralgias.   Allergic/Immunologic: Positive for environmental allergies.              Objective   Vital Signs:  /50   Pulse 63   Resp 16   Ht 170.2 cm (67\")   Wt 80.3 kg (177 lb)   SpO2 97%   BMI 27.72 kg/m²   Physical Exam  Vitals and nursing note reviewed.   Constitutional:       Appearance: Normal appearance. She is well-developed and well-groomed.   HENT:      Head: Normocephalic.      Right Ear: Tympanic membrane and ear canal normal.      Left Ear: Tympanic membrane and ear canal normal.      Nose: Nose normal.      Mouth/Throat:      Pharynx: Oropharynx is clear.   Eyes:      General: Lids are normal.      Conjunctiva/sclera: Conjunctivae normal.   Neck:      Thyroid: No thyromegaly.   Cardiovascular:      Rate and Rhythm: Normal rate and regular rhythm.      Heart sounds: Normal heart sounds.   Pulmonary:      Effort: Pulmonary effort is normal.      Breath sounds: Normal breath sounds.   Abdominal:      General: Bowel sounds are normal.      Palpations: Abdomen is soft.   Musculoskeletal:      Cervical back: Neck supple.   Skin:     General: Skin is warm and dry.   Neurological:      Mental Status: She is alert and oriented to person, place, and time.   Psychiatric:         Mood and Affect: Mood normal.         The following data was reviewed by: JEFFERSON Mederos on 07/24/2025:    Orders Only on 07/08/2025   Component Date Value Ref Range Status    WBC 07/17/2025 5.11  3.40 - 10.80 10*3/mm3 Final    RBC 07/17/2025 4.74  3.77 - 5.28 10*6/mm3 Final    Hemoglobin 07/17/2025 14.5  12.0 - 15.9 g/dL Final    Hematocrit 07/17/2025 43.6  34.0 - 46.6 % Final    MCV 07/17/2025 92.0  79.0 - 97.0 fL Final    MCH 07/17/2025 30.6  26.6 - 33.0 pg Final    MCHC 07/17/2025 33.3  31.5 - 35.7 g/dL Final    RDW 07/17/2025 12.2 " (L)  12.3 - 15.4 % Final    Platelets 07/17/2025 224  140 - 450 10*3/mm3 Final    Neutrophil Rel % 07/17/2025 46.7  42.7 - 76.0 % Final    Lymphocyte Rel % 07/17/2025 37.8  19.6 - 45.3 % Final    Monocyte Rel % 07/17/2025 8.4  5.0 - 12.0 % Final    Eosinophil Rel % 07/17/2025 5.7  0.3 - 6.2 % Final    Basophil Rel % 07/17/2025 1.2  0.0 - 1.5 % Final    Neutrophils Absolute 07/17/2025 2.39  1.70 - 7.00 10*3/mm3 Final    Lymphocytes Absolute 07/17/2025 1.93  0.70 - 3.10 10*3/mm3 Final    Monocytes Absolute 07/17/2025 0.43  0.10 - 0.90 10*3/mm3 Final    Eosinophils Absolute 07/17/2025 0.29  0.00 - 0.40 10*3/mm3 Final    Basophils Absolute 07/17/2025 0.06  0.00 - 0.20 10*3/mm3 Final    Immature Granulocyte Rel % 07/17/2025 0.2  0.0 - 0.5 % Final    Immature Grans Absolute 07/17/2025 0.01  0.00 - 0.05 10*3/mm3 Final    nRBC 07/17/2025 0.0  0.0 - 0.2 /100 WBC Final    Glucose 07/17/2025 100 (H)  65 - 99 mg/dL Final    BUN 07/17/2025 21.0  8.0 - 23.0 mg/dL Final    Creatinine 07/17/2025 1.10 (H)  0.57 - 1.00 mg/dL Final    EGFR Result 07/17/2025 52.5 (L)  >60.0 mL/min/1.73 Final    Comment: GFR Categories in Chronic Kidney Disease (CKD)  GFR Category          GFR (mL/min/1.73)    Interpretation  G1                    90 or greater        Normal or high  (1)  G2                    60-89                Mild decrease  (1)  G3a                   45-59                Mild to moderate  decrease  G3b                   30-44                Moderate to  severe decrease  G4                    15-29                Severe decrease  G5                    14 or less           Kidney failure  (1)In the absence of evidence of kidney disease, neither  GFR category G1 or G2 fulfill the criteria for CKD.  eGFR calculation 2021 CKD-EPI creatinine equation, which  does not include race as a factor      BUN/Creatinine Ratio 07/17/2025 19.1  7.0 - 25.0 Final    Sodium 07/17/2025 142  136 - 145 mmol/L Final    Potassium 07/17/2025 4.3  3.5 - 5.2  mmol/L Final    Chloride 07/17/2025 105  98 - 107 mmol/L Final    Total CO2 07/17/2025 28.0  22.0 - 29.0 mmol/L Final    Calcium 07/17/2025 9.7  8.6 - 10.5 mg/dL Final    Total Protein 07/17/2025 7.3  6.0 - 8.5 g/dL Final    Albumin 07/17/2025 4.5  3.5 - 5.2 g/dL Final    Globulin 07/17/2025 2.8  gm/dL Final    A/G Ratio 07/17/2025 1.6  g/dL Final    Total Bilirubin 07/17/2025 0.5  0.0 - 1.2 mg/dL Final    Alkaline Phosphatase 07/17/2025 57  39 - 117 U/L Final    AST (SGOT) 07/17/2025 31  1 - 32 U/L Final    ALT (SGPT) 07/17/2025 20  1 - 33 U/L Final    Total Cholesterol 07/17/2025 154  0 - 200 mg/dL Final    Comment: Cholesterol Reference Ranges  (U.S. Department of Health and Human Services ATP III  Classifications)  Desirable          <200 mg/dL  Borderline High    200-239 mg/dL  High Risk          >240 mg/dL  Triglyceride Reference Ranges  (U.S. Department of Health and Human Services ATP III  Classifications)  Normal           <150 mg/dL  Borderline High  150-199 mg/dL  High             200-499 mg/dL  Very High        >500 mg/dL  HDL Reference Ranges  (U.S. Department of Health and Human Services ATP III  Classifications)  Low     <40 mg/dl (major risk factor for CHD)  High    >60 mg/dl ('negative' risk factor for CHD)  LDL Reference Ranges  (U.S. Department of Health and Human Services ATP III  Classifications)  Optimal          <100 mg/dL  Near Optimal     100-129 mg/dL  Borderline High  130-159 mg/dL  High             160-189 mg/dL  Very High        >189 mg/dL  LDL is calculated using the NIH LDL-C calculation.      Triglycerides 07/17/2025 224 (H)  0 - 150 mg/dL Final    HDL Cholesterol 07/17/2025 43  40 - 60 mg/dL Final    VLDL Cholesterol Mino 07/17/2025 37  5 - 40 mg/dL Final    LDL Chol Calc (NIH) 07/17/2025 74  0 - 100 mg/dL Final    LDL/HDL RATIO 07/17/2025 1.54   Final    TSH 07/17/2025 1.010  0.270 - 4.200 uIU/mL Final            Assessment and Plan      Essential hypertension  Hypertension is stable  and controlled  Continue current treatment regimen.  Blood pressure will be reassessed in 6 months.         Mixed hyperlipidemia   Continue atorvastatin          Acquired hypothyroidism  Continue current dose of levothyroxine        Prediabetes  Continue with lifestyle modifications        Osteopenia with high risk of fracture  On fosamax        Anxiety  Continue lexapro        Medicare annual wellness visit, subsequent         Stage 3a chronic kidney disease  Stable , will continue to monitor                  Follow Up   Return in about 6 months (around 1/24/2026) for labs same day.  Patient was given instructions and counseling regarding her condition or for health maintenance advice. Please see specific information pulled into the AVS if appropriate.

## 2025-08-19 ENCOUNTER — OFFICE VISIT (OUTPATIENT)
Dept: ORTHOPEDIC SURGERY | Facility: CLINIC | Age: 75
End: 2025-08-19
Payer: MEDICARE

## 2025-08-19 VITALS — WEIGHT: 180.2 LBS | BODY MASS INDEX: 28.28 KG/M2 | TEMPERATURE: 98.7 F | HEIGHT: 67 IN

## 2025-08-19 DIAGNOSIS — G89.29 CHRONIC PAIN OF BOTH KNEES: ICD-10-CM

## 2025-08-19 DIAGNOSIS — M25.561 CHRONIC PAIN OF BOTH KNEES: ICD-10-CM

## 2025-08-19 DIAGNOSIS — M17.10 PATELLOFEMORAL ARTHRITIS: Primary | ICD-10-CM

## 2025-08-19 DIAGNOSIS — M25.562 CHRONIC PAIN OF BOTH KNEES: ICD-10-CM

## 2025-08-19 RX ORDER — LIDOCAINE HYDROCHLORIDE 10 MG/ML
2 INJECTION, SOLUTION EPIDURAL; INFILTRATION; INTRACAUDAL; PERINEURAL
Status: COMPLETED | OUTPATIENT
Start: 2025-08-19 | End: 2025-08-19

## 2025-08-19 RX ORDER — METHYLPREDNISOLONE ACETATE 80 MG/ML
80 INJECTION, SUSPENSION INTRA-ARTICULAR; INTRALESIONAL; INTRAMUSCULAR; SOFT TISSUE
Status: COMPLETED | OUTPATIENT
Start: 2025-08-19 | End: 2025-08-19

## 2025-08-19 RX ORDER — METHYLPREDNISOLONE ACETATE 80 MG/ML
1 INJECTION, SUSPENSION INTRA-ARTICULAR; INTRALESIONAL; INTRAMUSCULAR; SOFT TISSUE
Status: COMPLETED | OUTPATIENT
Start: 2025-08-19 | End: 2025-08-19

## 2025-08-19 RX ADMIN — METHYLPREDNISOLONE ACETATE 1 ML: 80 INJECTION, SUSPENSION INTRA-ARTICULAR; INTRALESIONAL; INTRAMUSCULAR; SOFT TISSUE at 11:33

## 2025-08-19 RX ADMIN — LIDOCAINE HYDROCHLORIDE 2 ML: 10 INJECTION, SOLUTION EPIDURAL; INFILTRATION; INTRACAUDAL; PERINEURAL at 11:32

## 2025-08-19 RX ADMIN — METHYLPREDNISOLONE ACETATE 80 MG: 80 INJECTION, SUSPENSION INTRA-ARTICULAR; INTRALESIONAL; INTRAMUSCULAR; SOFT TISSUE at 11:32

## 2025-08-19 RX ADMIN — LIDOCAINE HYDROCHLORIDE 2 ML: 10 INJECTION, SOLUTION EPIDURAL; INFILTRATION; INTRACAUDAL; PERINEURAL at 11:33
